# Patient Record
Sex: MALE | Race: WHITE | NOT HISPANIC OR LATINO | Employment: OTHER | URBAN - METROPOLITAN AREA
[De-identification: names, ages, dates, MRNs, and addresses within clinical notes are randomized per-mention and may not be internally consistent; named-entity substitution may affect disease eponyms.]

---

## 2019-04-10 ENCOUNTER — OFFICE VISIT (OUTPATIENT)
Dept: ORTHOPEDICS | Facility: CLINIC | Age: 69
End: 2019-04-10
Payer: MEDICARE

## 2019-04-10 VITALS
DIASTOLIC BLOOD PRESSURE: 84 MMHG | BODY MASS INDEX: 31.02 KG/M2 | WEIGHT: 193 LBS | HEART RATE: 82 BPM | SYSTOLIC BLOOD PRESSURE: 138 MMHG | HEIGHT: 66 IN

## 2019-04-10 DIAGNOSIS — M54.12 CERVICAL RADICULITIS: Primary | ICD-10-CM

## 2019-04-10 PROCEDURE — 1101F PR PT FALLS ASSESS DOC 0-1 FALLS W/OUT INJ PAST YR: ICD-10-PCS | Mod: ,,, | Performed by: ORTHOPAEDIC SURGERY

## 2019-04-10 PROCEDURE — 1101F PT FALLS ASSESS-DOCD LE1/YR: CPT | Mod: ,,, | Performed by: ORTHOPAEDIC SURGERY

## 2019-04-10 PROCEDURE — 99203 PR OFFICE/OUTPT VISIT, NEW, LEVL III, 30-44 MIN: ICD-10-PCS | Mod: ,,, | Performed by: ORTHOPAEDIC SURGERY

## 2019-04-10 PROCEDURE — 99203 OFFICE O/P NEW LOW 30 MIN: CPT | Mod: ,,, | Performed by: ORTHOPAEDIC SURGERY

## 2019-04-10 PROCEDURE — 72040: ICD-10-PCS | Mod: ,,, | Performed by: ORTHOPAEDIC SURGERY

## 2019-04-10 PROCEDURE — 72040 X-RAY EXAM NECK SPINE 2-3 VW: CPT | Mod: ,,, | Performed by: ORTHOPAEDIC SURGERY

## 2019-04-10 RX ORDER — TIZANIDINE 4 MG/1
4 TABLET ORAL EVERY 8 HOURS
Qty: 60 TABLET | Refills: 0 | Status: SHIPPED | OUTPATIENT
Start: 2019-04-10 | End: 2019-04-20

## 2019-04-10 RX ORDER — GABAPENTIN 300 MG/1
300 CAPSULE ORAL 3 TIMES DAILY
COMMUNITY
End: 2020-03-05 | Stop reason: SDUPTHER

## 2019-04-10 RX ORDER — MELOXICAM 15 MG/1
15 TABLET ORAL DAILY
COMMUNITY
End: 2019-05-20

## 2019-04-10 RX ORDER — METHYLPREDNISOLONE 4 MG/1
TABLET ORAL
Qty: 1 PACKAGE | Refills: 0 | Status: SHIPPED | OUTPATIENT
Start: 2019-04-10 | End: 2019-05-01

## 2019-04-10 RX ORDER — HYDROCODONE BITARTRATE AND ACETAMINOPHEN 5; 325 MG/1; MG/1
1 TABLET ORAL EVERY 6 HOURS PRN
Qty: 28 TABLET | Refills: 0 | Status: SHIPPED | OUTPATIENT
Start: 2019-04-10 | End: 2019-04-17

## 2019-04-10 NOTE — PROGRESS NOTES
Subjective:       Patient ID: Tyler Hannah is a 68 y.o. male.    Chief Complaint: Pain of the Neck (Neck pain since Sunday and right arm numbness to fingers. No other treatment)      History of Present Illness  68-year-old gentleman with acute onset of right arm numbness and neck pain one week ago. He is been taking meloxicam and gabapentin for chronic low back pain arthritis and radiculopathy in these medicines will seem to help he has no bowel or bladder  dysfunction he does complain of pain range of motion of his neck    Current Medications  Current Outpatient Medications   Medication Sig Dispense Refill    gabapentin (NEURONTIN) 300 MG capsule Take 300 mg by mouth 3 (three) times daily.      meloxicam (MOBIC) 15 MG tablet Take 15 mg by mouth once daily.       No current facility-administered medications for this visit.        Allergies  Review of patient's allergies indicates:  No Known Allergies    Past Medical History  Past Medical History:   Diagnosis Date    Arthritis        Surgical History  Past Surgical History:   Procedure Laterality Date    right arm         Family History:   History reviewed. No pertinent family history.    Social History:   Social History     Socioeconomic History    Marital status:      Spouse name: Not on file    Number of children: Not on file    Years of education: Not on file    Highest education level: Not on file   Occupational History    Not on file   Social Needs    Financial resource strain: Not on file    Food insecurity:     Worry: Not on file     Inability: Not on file    Transportation needs:     Medical: Not on file     Non-medical: Not on file   Tobacco Use    Smoking status: Never Smoker    Smokeless tobacco: Never Used   Substance and Sexual Activity    Alcohol use: Not on file    Drug use: Not on file    Sexual activity: Not on file   Lifestyle    Physical activity:     Days per week: Not on file     Minutes per session: Not on file    Stress: Not on file   Relationships    Social connections:     Talks on phone: Not on file     Gets together: Not on file     Attends Voodoo service: Not on file     Active member of club or organization: Not on file     Attends meetings of clubs or organizations: Not on file     Relationship status: Not on file   Other Topics Concern    Not on file   Social History Narrative    Not on file       Hospitalization/Major Diagnostic Procedure:     Review of  Systems     General/Constitutional:  Chills denies. Fatigue denies. Fever denies. Weight gain denies. Weight loss denies.    Respiratory:  Shortness of breath denies.    Cardiovascular:  Chest pain denies.    Gastrointestinal:  Constipation denies. Diarrhea denies. Nausea denies. Vomiting denies.     Hematology:  Easy bruising denies. Prolonged bleeding denies.     Genitourinary:  Frequent urination denies. Pain in lower back denies. Painful urination denies.     Musculoskeletal:  See HPI for details    Skin:  Rash denies.    Neurologic:  Dizziness denies. Gait abnormalities denies. Seizures denies. Tingling/Numbess denies.    Psychiatric:  Anxiety denies. Depressed mood denies.     Objective:   Vital Signs:   Vitals:    04/10/19 0919   BP: 138/84   Pulse: 82        Physical Exam      General Examination:     Constitutional: The patient is alert and oriented to lace person and time. Mood is pleasant.     Head/Face: Normal facial features normal eyebrows    Eyes: Normal extraocular motion bilaterally    Lungs: Respirations are equal and unlabored    Gait is coordinated.    Cardiovascular: There are no swelling or varicosities present.    Lymphatic: Negative for adenopathy    Skin: Normal    Neurological: Level of consciousness normal. Oriented to place person and time and situation    Psychiatric: Oriented to time place person and situation    Examination of cervical spine shows marked restriction of motion tenderness in the right trapezius and paraspinous muscles without spasm Spurling's maneuver is positive on the right subjective numbness C6 nerve root distribution on the right motor exam grossly intact reflexes symmetrical but hypoactive Phalen's test negative.    XRAY Report/ Interpretation : AP and lateral x-rays of the cervical spine were performed today. Indications neck pain. Findings: Now multilevel posterior facet arthritis demonstrated mild narrowing at C3-4 disc space      Assessment:       1. Cervical  radiculitis        Plan:       Tyler was seen today for pain.    Diagnoses and all orders for this visit:    Cervical radiculitis  -     X-Ray Cervical Spine AP And Lateral         No follow-ups on file.    Ramya options were discussed we'll start him on a course of medication for her symptoms. I've advised that temporarily stop taking his meloxicam and should not take both meloxicam and a steroid Dosepak at the same time we'll see him back in 10-14 days and if his symptoms don't resolve we will order an MRI of the cervical spine. He has been advised her to stop taking the meloxicam until he finishes the Medrol Dosepak and understands that they should not be taken together    This note was created using Dragon voice recognition software that occasionally misinterpreted phrases or words.

## 2019-04-22 ENCOUNTER — TELEPHONE (OUTPATIENT)
Dept: ORTHOPEDICS | Facility: CLINIC | Age: 69
End: 2019-04-22

## 2019-04-22 DIAGNOSIS — M54.12 CERVICAL RADICULITIS: Primary | ICD-10-CM

## 2019-04-22 NOTE — TELEPHONE ENCOUNTER
----- Message from Flaca Jamil sent at 4/22/2019  9:10 AM CDT -----  Patient called stated pain isn't any better.  Needs MRI of cervical and right shoulder.

## 2019-05-06 ENCOUNTER — TELEPHONE (OUTPATIENT)
Dept: ORTHOPEDICS | Facility: CLINIC | Age: 69
End: 2019-05-06

## 2019-05-06 ENCOUNTER — OFFICE VISIT (OUTPATIENT)
Dept: ORTHOPEDICS | Facility: CLINIC | Age: 69
End: 2019-05-06
Payer: MEDICARE

## 2019-05-06 VITALS
WEIGHT: 188 LBS | BODY MASS INDEX: 30.22 KG/M2 | HEIGHT: 66 IN | DIASTOLIC BLOOD PRESSURE: 80 MMHG | HEART RATE: 95 BPM | SYSTOLIC BLOOD PRESSURE: 158 MMHG

## 2019-05-06 DIAGNOSIS — M48.02 FORAMINAL STENOSIS OF CERVICAL REGION: ICD-10-CM

## 2019-05-06 DIAGNOSIS — M50.30 DEGENERATIVE CERVICAL DISC: Primary | ICD-10-CM

## 2019-05-06 PROCEDURE — 1101F PR PT FALLS ASSESS DOC 0-1 FALLS W/OUT INJ PAST YR: ICD-10-PCS | Mod: ,,, | Performed by: ORTHOPAEDIC SURGERY

## 2019-05-06 PROCEDURE — 99213 PR OFFICE/OUTPT VISIT, EST, LEVL III, 20-29 MIN: ICD-10-PCS | Mod: ,,, | Performed by: ORTHOPAEDIC SURGERY

## 2019-05-06 PROCEDURE — 99213 OFFICE O/P EST LOW 20 MIN: CPT | Mod: ,,, | Performed by: ORTHOPAEDIC SURGERY

## 2019-05-06 PROCEDURE — 1101F PT FALLS ASSESS-DOCD LE1/YR: CPT | Mod: ,,, | Performed by: ORTHOPAEDIC SURGERY

## 2019-05-06 RX ORDER — FLUTICASONE FUROATE AND VILANTEROL TRIFENATATE 100; 25 UG/1; UG/1
POWDER RESPIRATORY (INHALATION)
Refills: 5 | COMMUNITY
Start: 2019-04-10 | End: 2019-06-10

## 2019-05-06 RX ORDER — HYDROCODONE BITARTRATE AND ACETAMINOPHEN 5; 325 MG/1; MG/1
1 TABLET ORAL EVERY 6 HOURS PRN
Qty: 28 TABLET | Refills: 0 | Status: SHIPPED | OUTPATIENT
Start: 2019-05-06 | End: 2019-05-13

## 2019-05-06 NOTE — PROGRESS NOTES
Subjective:       Patient ID: Tyler Hannah is a 68 y.o. male.    Chief Complaint: Pain of the Neck (MRI results) and Pain of the Right Shoulder      History of Present Illness  Mr. Hannah returns for follow-up still has right-sided neck pain radiating down to his right thumb is here to discuss results of the MRI    Current Medications  Current Outpatient Medications   Medication Sig Dispense Refill    BREO ELLIPTA 100-25 mcg/dose diskus inhaler   5    gabapentin (NEURONTIN) 300 MG capsule Take 300 mg by mouth 3 (three) times daily.      meloxicam (MOBIC) 15 MG tablet Take 15 mg by mouth once daily.      HYDROcodone-acetaminophen (NORCO) 5-325 mg per tablet Take 1 tablet by mouth every 6 (six) hours as needed for Pain. 28 tablet 0     No current facility-administered medications for this visit.        Allergies  Review of patient's allergies indicates:  No Known Allergies    Past Medical History  Past Medical History:   Diagnosis Date    Arthritis        Surgical History  Past Surgical History:   Procedure Laterality Date    right arm         Family History:   History reviewed. No pertinent family history.    Social History:   Social History     Socioeconomic History    Marital status:      Spouse name: Not on file    Number of children: Not on file    Years of education: Not on file    Highest education level: Not on file   Occupational History    Not on file   Social Needs    Financial resource strain: Not on file    Food insecurity:     Worry: Not on file     Inability: Not on file    Transportation needs:     Medical: Not on file     Non-medical: Not on file   Tobacco Use    Smoking status: Never Smoker    Smokeless tobacco: Never Used   Substance and Sexual Activity    Alcohol use: Not on file    Drug use: Not on file    Sexual activity: Not on file   Lifestyle    Physical activity:     Days per week: Not on file     Minutes per session: Not on file    Stress: Not on file    Relationships    Social connections:     Talks on phone: Not on file     Gets together: Not on file     Attends Jewish service: Not on file     Active member of club or organization: Not on file     Attends meetings of clubs or organizations: Not on file     Relationship status: Not on file   Other Topics Concern    Not on file   Social History Narrative    Not on file       Hospitalization/Major Diagnostic Procedure:     Review of Systems     General/Constitutional:  Chills denies. Fatigue denies. Fever denies. Weight gain denies. Weight loss denies.    Respiratory:  Shortness of breath denies.    Cardiovascular:  Chest pain denies.    Gastrointestinal:  Constipation denies. Diarrhea denies. Nausea denies. Vomiting denies.     Hematology:  Easy bruising denies. Prolonged bleeding denies.     Genitourinary:  Frequent urination denies. Pain in lower back denies. Painful urination denies.     Musculoskeletal:  See HPI for details    Skin:  Rash denies.    Neurologic:  Dizziness denies. Gait abnormalities denies. Seizures denies. Tingling/Numbess denies.    Psychiatric:  Anxiety denies. Depressed mood denies.     Objective:   Vital Signs:   Vitals:    05/06/19 1242   BP: (!) 158/80   Pulse:         Physical Exam      General Examination:     Constitutional: The patient is alert and oriented to lace person and time. Mood is pleasant.     Head/Face: Normal facial features normal eyebrows    Eyes: Normal extraocular motion bilaterally    Lungs: Respirations are equal and unlabored    Gait is coordinated.    Cardiovascular: There are no swelling or varicosities present.    Lymphatic: Negative for adenopathy    Skin: Normal    Neurological: Level of consciousness normal. Oriented to place person and time and situation    Psychiatric: Oriented to time place person and situation    Examination cervical spine shows moderate restriction of motion Meme's maneuver positive on the right side subjective numbness noted  in the right thumb and index finger  XRAY Report/ Interpretation: Cervical MRI was personally reviewed is evidence of disc degeneration C4-5 5667, Bill stenosis noted on the right at C5-6 and left-sided C6-7 I look at the MRI I think there is a low foraminal stenosis on the right side at C6-7      Assessment:       1. Degenerative cervical disc    2. Foraminal stenosis of cervical region        Plan:       Tyler was seen today for pain and pain.    Diagnoses and all orders for this visit:    Degenerative cervical disc    Foraminal stenosis of cervical region    Other orders  -     HYDROcodone-acetaminophen (NORCO) 5-325 mg per tablet; Take 1 tablet by mouth every 6 (six) hours as needed for Pain.         Follow up in about 1 month (around 6/3/2019).    Transforaminal epidural steroid injections of the right C6 C6 and C7 nerve roots advise at this time    This note was created using Dragon voice recognition software that occasionally misinterpreted phrases or words.

## 2019-05-07 DIAGNOSIS — M54.12 CERVICAL RADICULOPATHY: Primary | ICD-10-CM

## 2019-05-20 RX ORDER — IBUPROFEN 200 MG
200 TABLET ORAL EVERY 6 HOURS PRN
COMMUNITY
End: 2020-03-05

## 2019-05-21 ENCOUNTER — HOSPITAL ENCOUNTER (OUTPATIENT)
Facility: AMBULARY SURGERY CENTER | Age: 69
Discharge: HOME | End: 2019-05-21
Attending: ANESTHESIOLOGY | Admitting: ANESTHESIOLOGY
Payer: MEDICARE

## 2019-05-21 DIAGNOSIS — M54.12 CERVICAL RADICULITIS: Primary | ICD-10-CM

## 2019-05-21 PROCEDURE — 99152 MOD SED SAME PHYS/QHP 5/>YRS: CPT | Mod: ,,, | Performed by: ANESTHESIOLOGY

## 2019-05-21 PROCEDURE — 99152 PR MOD CONSCIOUS SEDATION, SAME PHYS, 5+ YRS, FIRST 15 MIN: ICD-10-PCS | Mod: ,,, | Performed by: ANESTHESIOLOGY

## 2019-05-21 PROCEDURE — 64479 PR INJECT ANES/STEROID FORAMEN CERV/THORACIC W IMG GUIDE ,1 LEVEL: ICD-10-PCS | Mod: RT,,, | Performed by: ANESTHESIOLOGY

## 2019-05-21 PROCEDURE — 64480 NJX AA&/STRD TFRM EPI C/T EA: CPT | Mod: RT,,, | Performed by: ANESTHESIOLOGY

## 2019-05-21 PROCEDURE — 64480 NJX AA&/STRD TFRM EPI C/T EA: CPT | Performed by: ANESTHESIOLOGY

## 2019-05-21 PROCEDURE — 64479 NJX AA&/STRD TFRM EPI C/T 1: CPT | Performed by: ANESTHESIOLOGY

## 2019-05-21 PROCEDURE — 64479 NJX AA&/STRD TFRM EPI C/T 1: CPT | Mod: RT,,, | Performed by: ANESTHESIOLOGY

## 2019-05-21 PROCEDURE — 64480 PRA INJECT ANES/STEROID FORAMEN CERV/THORACIC W IMG GUIDE ,EA ADD LEVEL: ICD-10-PCS | Mod: RT,,, | Performed by: ANESTHESIOLOGY

## 2019-05-21 RX ORDER — FENTANYL CITRATE 50 UG/ML
INJECTION, SOLUTION INTRAMUSCULAR; INTRAVENOUS
Status: DISCONTINUED | OUTPATIENT
Start: 2019-05-21 | End: 2019-05-21 | Stop reason: HOSPADM

## 2019-05-21 RX ORDER — MIDAZOLAM HYDROCHLORIDE 1 MG/ML
INJECTION INTRAMUSCULAR; INTRAVENOUS
Status: DISCONTINUED | OUTPATIENT
Start: 2019-05-21 | End: 2019-05-21 | Stop reason: HOSPADM

## 2019-05-21 RX ORDER — LIDOCAINE HYDROCHLORIDE AND EPINEPHRINE 10; 10 MG/ML; UG/ML
INJECTION, SOLUTION INFILTRATION; PERINEURAL
Status: DISCONTINUED | OUTPATIENT
Start: 2019-05-21 | End: 2019-05-21 | Stop reason: HOSPADM

## 2019-05-21 RX ORDER — LIDOCAINE HYDROCHLORIDE 10 MG/ML
INJECTION, SOLUTION EPIDURAL; INFILTRATION; INTRACAUDAL; PERINEURAL
Status: DISCONTINUED | OUTPATIENT
Start: 2019-05-21 | End: 2019-05-21 | Stop reason: HOSPADM

## 2019-05-21 RX ORDER — LIDOCAINE HYDROCHLORIDE AND EPINEPHRINE 10; 10 MG/ML; UG/ML
INJECTION, SOLUTION INFILTRATION; PERINEURAL
Status: DISCONTINUED
Start: 2019-05-21 | End: 2019-05-21 | Stop reason: HOSPADM

## 2019-05-21 RX ORDER — LIDOCAINE HYDROCHLORIDE 10 MG/ML
INJECTION, SOLUTION EPIDURAL; INFILTRATION; INTRACAUDAL; PERINEURAL
Status: DISCONTINUED
Start: 2019-05-21 | End: 2019-05-21 | Stop reason: HOSPADM

## 2019-05-21 RX ORDER — SODIUM CHLORIDE, SODIUM LACTATE, POTASSIUM CHLORIDE, CALCIUM CHLORIDE 600; 310; 30; 20 MG/100ML; MG/100ML; MG/100ML; MG/100ML
INJECTION, SOLUTION INTRAVENOUS CONTINUOUS
Status: DISCONTINUED | OUTPATIENT
Start: 2019-05-21 | End: 2019-05-21 | Stop reason: HOSPADM

## 2019-05-21 RX ORDER — DEXAMETHASONE SODIUM PHOSPHATE 10 MG/ML
INJECTION INTRAMUSCULAR; INTRAVENOUS
Status: DISCONTINUED
Start: 2019-05-21 | End: 2019-05-21 | Stop reason: HOSPADM

## 2019-05-21 RX ORDER — DEXAMETHASONE SODIUM PHOSPHATE 10 MG/ML
INJECTION INTRAMUSCULAR; INTRAVENOUS
Status: DISCONTINUED | OUTPATIENT
Start: 2019-05-21 | End: 2019-05-21 | Stop reason: HOSPADM

## 2019-05-21 RX ADMIN — SODIUM CHLORIDE, SODIUM LACTATE, POTASSIUM CHLORIDE, CALCIUM CHLORIDE: 600; 310; 30; 20 INJECTION, SOLUTION INTRAVENOUS at 11:05

## 2019-05-21 NOTE — DISCHARGE SUMMARY
[facility]  Discharge Note  Short Stay    Admit Date: 5/21/2019    Discharge Date and Time: 5/21/2019    Attending Physician: Blade Devine MD     Discharge Provider: Blade Devine    Diagnoses:  Active Hospital Problems    Diagnosis  POA    *Cervical radiculitis [M54.12]  Yes      Resolved Hospital Problems   No resolved problems to display.       Hospital Course: Cervical WILLIS  Discharged Condition: Good    Final Diagnoses:   Active Hospital Problems    Diagnosis  POA    *Cervical radiculitis [M54.12]  Yes      Resolved Hospital Problems   No resolved problems to display.       Disposition: Home or Self Care    Follow up/Patient Instructions:    Medications:  Reconciled Home Medications:      Medication List      CONTINUE taking these medications    BREO ELLIPTA 100-25 mcg/dose diskus inhaler  Generic drug:  fluticasone furoate-vilanterol     gabapentin 300 MG capsule  Commonly known as:  NEURONTIN  Take 300 mg by mouth 3 (three) times daily.     ibuprofen 200 MG tablet  Commonly known as:  ADVIL,MOTRIN  Take 200 mg by mouth every 6 (six) hours as needed for Pain.          Discharge Procedure Orders   Call MD for:  temperature >100.4     Call MD for:  persistent nausea and vomiting or diarrhea     Call MD for:  severe uncontrolled pain     Call MD for:  redness, tenderness, or signs of infection (pain, swelling, redness, odor or green/yellow discharge around incision site)     Call MD for:  difficulty breathing or increased cough     Call MD for:  severe persistent headache        Follow up with MD in 2-3 weeks    Discharge Procedure Orders (must include Diet, Follow-up, Activity):   Discharge Procedure Orders (must include Diet, Follow-up, Activity)   Call MD for:  temperature >100.4     Call MD for:  persistent nausea and vomiting or diarrhea     Call MD for:  severe uncontrolled pain     Call MD for:  redness, tenderness, or signs of infection (pain, swelling, redness, odor or green/yellow discharge  around incision site)     Call MD for:  difficulty breathing or increased cough     Call MD for:  severe persistent headache

## 2019-05-21 NOTE — OP NOTE
PROCEDURE DATE: 5/21/2019    PROCEDURE: Right C6 and C7 transforaminal epidural steroid injection under fluoroscopy     DIAGNOSIS: cervical disc displacement, cervical radiculopathy     Post op diagnosis: Same     PHYSICIAN: Blade Devine MD     MEDICATIONS INJECTED: Decadron 5mg and 0.5ml 1% lidocaine at each nerve root.     LOCAL ANESTHETIC INJECTED: Lidocaine 1%. 1 ml per site.     SEDATION MEDICATIONS: RN IV sedation    ESTIMATED BLOOD LOSS: none     COMPLICATIONS: none     TECHNIQUE: A time-out was taken to identify patient and procedure side prior to starting the procedure. The patient was placed in a prone position, prepped and draped in the usual sterile fashion using ChloraPrep and sterile towels. The area to be injected was determined under fluoroscopic guidance in AP and oblique view. Local anesthetic was given by raising a wheal and going down to the hub of a 25-gauge 1.5 inch needle. In oblique view, a 2inch 25-gauge bent-tip spinal needle was introduced towards posterior inferior portion of the right C6 and C7 foramen using the oblique view. C-arm was rotated about 45 degrees off vertical and 10-20 degrees cephalad to caudal. The needle was advanced in AP until tip of needle was past the lateral margins. 0.5ml contrast dye was injected to confirm appropriate placement and that there was no vascular uptake.  Test dose of 1ml  1% lidocaine with epinephrine was given.  There was no changes in vital signs.  After negative aspiration for blood or CSF, the medication was then injected. This was performed at the right C6 and C7 level(s). The patient tolerated the procedure well.     The patient was monitored after the procedure. Patient was given post procedure and discharge instructions to follow at home. The patient was discharged in a stable condition.

## 2019-05-21 NOTE — PLAN OF CARE
Patient sitting in chair and is able to stand up and ambulate at bedside without dizziness. He  states ready to go home; denies pain nausea or any weakness.  Patient's spouse present at chairside and states she is ready to take patient home and thats he is driving the patient home. All belongings listed on pre op check list have been returned to patient. Patient states has own ice pack at home to use for prn injection site pain. Patient and his spouse able to teach back all discharge instructions.

## 2019-05-21 NOTE — DISCHARGE INSTRUCTIONS
Anesthesia information    Anesthesia Safety      You have been given medicine  to sedate you during your procedure today. This may have included both a pain medicine and sleeping medicine. Most of the effects have worn off; however, you may continue to have some drowsiness for the next  24 hours. Anesthesia and pain medicines can cause nausea, sleepiness, dizziness and  constipation.    HOME CARE:  1) For the next EIGHT HOURS, you should be watched by a responsible adult to look for any worsening of your condition.  2) DO NOT DRINK any ALCOHOL for the next 24 HOURS.  3) DO NOT DRIVE or operate dangerous machinery during the next 24 HOURS.  FOLLOW UP with your doctor or this facility if you are not alert and back to your usual level of activity within 24 hrs.  GET PROMPT MEDICAL ATTENTION if any of the following occur:  -- Increased drowsiness  -- Increased weakness or dizziness  -- Repeated vomiting  -- If you cannot be awakened    Before leaving, please make sure you have all your personal belongings such as glasses, purses, wallets, keys, cell phones, jewelry, jackets etc Pain injection instructions:     This procedure may take a couple weeks to relieve pain    No driving for 24 hrs.   Activity as tolerated- gradually increase activities.  Dont lift over 10 lbs for 24 hrs   No heat at injection sites x 2 days. No heating pads, hot tubs, saunas, or swimming in any body of water or pool for 2 days.  Use ice pack for mild swelling and for comfort , apply for 20 minutes, remove for 20 minute intervals. No direct contact of ice itself  to skin.  May shower today. Do not allow shower water to hit on injection site foe 2 days.No tub baths for two days.      Resume Aspirin, Plavix, or Coumadin the day after the procedure unless otherwise instructed.   If diabetic,monitor your glucose carefully as steroids can increase your glucose level    Seek immediate medical help for:   Severe increase in your usual pain or  appearance of new pain.  Prolonged (more than 8 hours) or increasing weakness or numbness in the legs or arms.  .    Fever above 101 ,Drainage,redness,active bleeding, or increased swelling at the injection site.  Headache, shortness of breath, chest pain, or breathing problems.

## 2019-05-22 VITALS
SYSTOLIC BLOOD PRESSURE: 144 MMHG | OXYGEN SATURATION: 95 % | TEMPERATURE: 98 F | WEIGHT: 188 LBS | DIASTOLIC BLOOD PRESSURE: 85 MMHG | HEIGHT: 66 IN | HEART RATE: 79 BPM | RESPIRATION RATE: 18 BRPM | BODY MASS INDEX: 30.22 KG/M2

## 2019-06-10 ENCOUNTER — OFFICE VISIT (OUTPATIENT)
Dept: ORTHOPEDICS | Facility: CLINIC | Age: 69
End: 2019-06-10
Payer: MEDICARE

## 2019-06-10 VITALS
HEIGHT: 66 IN | WEIGHT: 190 LBS | BODY MASS INDEX: 30.53 KG/M2 | DIASTOLIC BLOOD PRESSURE: 78 MMHG | SYSTOLIC BLOOD PRESSURE: 138 MMHG | HEART RATE: 85 BPM

## 2019-06-10 DIAGNOSIS — M54.12 CERVICAL RADICULITIS: ICD-10-CM

## 2019-06-10 DIAGNOSIS — M48.02 FORAMINAL STENOSIS OF CERVICAL REGION: ICD-10-CM

## 2019-06-10 DIAGNOSIS — M50.30 DEGENERATIVE CERVICAL DISC: Primary | ICD-10-CM

## 2019-06-10 PROCEDURE — 99213 PR OFFICE/OUTPT VISIT, EST, LEVL III, 20-29 MIN: ICD-10-PCS | Mod: ,,, | Performed by: ORTHOPAEDIC SURGERY

## 2019-06-10 PROCEDURE — 99213 OFFICE O/P EST LOW 20 MIN: CPT | Mod: ,,, | Performed by: ORTHOPAEDIC SURGERY

## 2019-06-10 PROCEDURE — 1101F PR PT FALLS ASSESS DOC 0-1 FALLS W/OUT INJ PAST YR: ICD-10-PCS | Mod: ,,, | Performed by: ORTHOPAEDIC SURGERY

## 2019-06-10 PROCEDURE — 1101F PT FALLS ASSESS-DOCD LE1/YR: CPT | Mod: ,,, | Performed by: ORTHOPAEDIC SURGERY

## 2019-06-10 RX ORDER — TRIAMCINOLONE ACETONIDE 1 MG/G
CREAM TOPICAL
COMMUNITY
Start: 2019-06-04 | End: 2020-03-05 | Stop reason: SDUPTHER

## 2019-06-10 RX ORDER — MELOXICAM 15 MG/1
15 TABLET ORAL DAILY
COMMUNITY
End: 2020-03-05 | Stop reason: SDUPTHER

## 2019-06-10 NOTE — PROGRESS NOTES
Subjective:       Patient ID: Tyler Hannah is a 68 y.o. male.    Chief Complaint: Pain of the Neck (He is doing great with no neck pain since getting injection with Dr Devine)      History of Present Illness  Is here to follow-up for right-sided cervicalgia with right upper extremity radiculitis. When he was last here he was seen by Dr. Mo who reviewed his MRI and then referred him for right C5, C6, and C7 transforaminal WILLIS. He had this done by Dr. Devine and is doing fantastically well. No Symptoms whatsoever.    Current Medications  Current Outpatient Medications   Medication Sig Dispense Refill    gabapentin (NEURONTIN) 300 MG capsule Take 300 mg by mouth 3 (three) times daily.      ibuprofen (ADVIL,MOTRIN) 200 MG tablet Take 200 mg by mouth every 6 (six) hours as needed for Pain.      meloxicam (MOBIC) 15 MG tablet Take 15 mg by mouth once daily.      triamcinolone acetonide 0.1% (KENALOG) 0.1 % cream        No current facility-administered medications for this visit.        Allergies  Review of patient's allergies indicates:  No Known Allergies    Past Medical History  Past Medical History:   Diagnosis Date    Arthritis        Surgical History  Past Surgical History:   Procedure Laterality Date    Injection,steroid,epidural,transforaminal approach Right 5/21/2019    Performed by Blade Devine MD at [facility]    right arm         Family History:   History reviewed. No pertinent family history.    Social History:   Social History     Socioeconomic History    Marital status:      Spouse name: Not on file    Number of children: Not on file    Years of education: Not on file    Highest education level: Not on file   Occupational History    Not on file   Social Needs    Financial resource strain: Not on file    Food insecurity:     Worry: Not on file     Inability: Not on file    Transportation needs:     Medical: Not on file     Non-medical: Not on file   Tobacco Use    Smoking status: Never Smoker     Smokeless tobacco: Never Used   Substance and Sexual Activity    Alcohol use: Not on file    Drug use: Not on file    Sexual activity: Not on file   Lifestyle    Physical activity:     Days per week: Not on file     Minutes per session: Not on file    Stress: Not on file   Relationships    Social connections:     Talks on phone: Not on file     Gets together: Not on file     Attends Spiritism service: Not on file     Active member of club or organization: Not on file     Attends meetings of clubs or organizations: Not on file     Relationship status: Not on file   Other Topics Concern    Not on file   Social History Narrative    Not on file       Hospitalization/Major Diagnostic Procedure:     Review of Systems     General/Constitutional:  Chills denies. Fatigue denies. Fever denies. Weight gain denies. Weight loss denies.    Respiratory:  Shortness of breath denies.    Cardiovascular:  Chest pain denies.    Gastrointestinal:  Constipation denies. Diarrhea denies. Nausea denies. Vomiting denies.     Hematology:  Easy bruising denies. Prolonged bleeding denies.     Genitourinary:  Frequent urination denies. Pain in lower back denies. Painful urination denies.     Musculoskeletal:  See HPI for details    Skin:  Rash denies.    Neurologic:  Dizziness denies. Gait abnormalities denies. Seizures denies. Tingling/Numbess denies.    Psychiatric:  Anxiety denies. Depressed mood denies.     Objective:   Vital Signs:   Vitals:    06/10/19 0802   BP: 138/78   Pulse: 85        Physical Exam      General Examination:     Constitutional: The patient is alert and oriented to lace person and time. Mood is pleasant.     Head/Face: Normal facial features normal eyebrows    Eyes: Normal extraocular motion bilaterally    Lungs: Respirations are equal and unlabored    Gait is coordinated.    Cardiovascular: There are no swelling or varicosities present.    Lymphatic: Negative for adenopathy    Skin: Normal    Neurological:  "Level of consciousness normal. Oriented to place person and time and situation    Psychiatric: Oriented to time place person and situation    Cervical exam: Active range of motion only mildly restricted. No significant tenderness palpation. Bilateral upper extremities and was reflected range of motion, equal and symmetric DTRs, and normal strength with a negative Blank's.  XRAY Report/ Interpretation: No new studies today      Assessment:       1. Degenerative cervical disc    2. Foraminal stenosis of cervical region    3. Cervical radiculitis        Plan:       Tyler was seen today for pain.    Diagnoses and all orders for this visit:    Degenerative cervical disc    Foraminal stenosis of cervical region    Cervical radiculitis         Follow up if symptoms worsen or fail to improve.  Sudhir Rousseau, physician's assistant served in the capacity as a "scribe" for this patient encounter  A "face to face" encounter occurred with Dr. Mo on this date  The treatment plan and medical decision making is outlined below:  Continue with activities as tolerated. Follow-up as needed. If symptoms returned then more than likely we would recommend a repeat right C5, C6, and C7 transforaminal WILLIS.    This note was created using Dragon voice recognition software that occasionally misinterpreted phrases or words.  "

## 2020-03-05 ENCOUNTER — OFFICE VISIT (OUTPATIENT)
Dept: FAMILY MEDICINE | Facility: CLINIC | Age: 70
End: 2020-03-05
Payer: MEDICARE

## 2020-03-05 ENCOUNTER — TELEPHONE (OUTPATIENT)
Dept: FAMILY MEDICINE | Facility: CLINIC | Age: 70
End: 2020-03-05

## 2020-03-05 VITALS
SYSTOLIC BLOOD PRESSURE: 138 MMHG | BODY MASS INDEX: 31.23 KG/M2 | HEIGHT: 67 IN | DIASTOLIC BLOOD PRESSURE: 82 MMHG | HEART RATE: 80 BPM | WEIGHT: 199 LBS | OXYGEN SATURATION: 94 %

## 2020-03-05 DIAGNOSIS — Z00.00 LABORATORY EXAM ORDERED AS PART OF ROUTINE GENERAL MEDICAL EXAMINATION: ICD-10-CM

## 2020-03-05 DIAGNOSIS — L30.9 ECZEMA, UNSPECIFIED TYPE: ICD-10-CM

## 2020-03-05 DIAGNOSIS — G47.33 OSA (OBSTRUCTIVE SLEEP APNEA): ICD-10-CM

## 2020-03-05 DIAGNOSIS — M50.30 DDD (DEGENERATIVE DISC DISEASE), CERVICAL: ICD-10-CM

## 2020-03-05 DIAGNOSIS — Z12.11 SCREENING FOR COLON CANCER: ICD-10-CM

## 2020-03-05 DIAGNOSIS — Z80.0 FAMILY HISTORY OF COLON CANCER IN MOTHER: ICD-10-CM

## 2020-03-05 DIAGNOSIS — J45.20 MILD INTERMITTENT ASTHMA WITHOUT COMPLICATION: ICD-10-CM

## 2020-03-05 DIAGNOSIS — M51.36 DDD (DEGENERATIVE DISC DISEASE), LUMBAR: Primary | ICD-10-CM

## 2020-03-05 DIAGNOSIS — Z11.59 ENCOUNTER FOR HEPATITIS C SCREENING TEST FOR LOW RISK PATIENT: ICD-10-CM

## 2020-03-05 DIAGNOSIS — Z12.5 PROSTATE CANCER SCREENING: ICD-10-CM

## 2020-03-05 PROBLEM — M51.369 DDD (DEGENERATIVE DISC DISEASE), LUMBAR: Status: ACTIVE | Noted: 2020-03-05

## 2020-03-05 PROCEDURE — 1159F PR MEDICATION LIST DOCUMENTED IN MEDICAL RECORD: ICD-10-PCS | Mod: S$GLB,,, | Performed by: NURSE PRACTITIONER

## 2020-03-05 PROCEDURE — 1159F MED LIST DOCD IN RCRD: CPT | Mod: S$GLB,,, | Performed by: NURSE PRACTITIONER

## 2020-03-05 PROCEDURE — 1101F PR PT FALLS ASSESS DOC 0-1 FALLS W/OUT INJ PAST YR: ICD-10-PCS | Mod: S$GLB,,, | Performed by: NURSE PRACTITIONER

## 2020-03-05 PROCEDURE — 99204 PR OFFICE/OUTPT VISIT, NEW, LEVL IV, 45-59 MIN: ICD-10-PCS | Mod: S$GLB,,, | Performed by: NURSE PRACTITIONER

## 2020-03-05 PROCEDURE — 1125F AMNT PAIN NOTED PAIN PRSNT: CPT | Mod: S$GLB,,, | Performed by: NURSE PRACTITIONER

## 2020-03-05 PROCEDURE — 1125F PR PAIN SEVERITY QUANTIFIED, PAIN PRESENT: ICD-10-PCS | Mod: S$GLB,,, | Performed by: NURSE PRACTITIONER

## 2020-03-05 PROCEDURE — 99204 OFFICE O/P NEW MOD 45 MIN: CPT | Mod: S$GLB,,, | Performed by: NURSE PRACTITIONER

## 2020-03-05 PROCEDURE — 1101F PT FALLS ASSESS-DOCD LE1/YR: CPT | Mod: S$GLB,,, | Performed by: NURSE PRACTITIONER

## 2020-03-05 RX ORDER — FLUTICASONE PROPIONATE 50 UG/1
POWDER, METERED RESPIRATORY (INHALATION)
COMMUNITY
End: 2020-03-05

## 2020-03-05 RX ORDER — TRIAMCINOLONE ACETONIDE 1 MG/G
CREAM TOPICAL
Qty: 454 BOTTLE | Refills: 1 | Status: SHIPPED | OUTPATIENT
Start: 2020-03-05 | End: 2020-09-01 | Stop reason: SDUPTHER

## 2020-03-05 RX ORDER — FLUTICASONE PROPIONATE 50 MCG
1 SPRAY, SUSPENSION (ML) NASAL DAILY
COMMUNITY

## 2020-03-05 RX ORDER — ALBUTEROL SULFATE 90 UG/1
2 AEROSOL, METERED RESPIRATORY (INHALATION) EVERY 4 HOURS PRN
Qty: 18 G | Refills: 2 | Status: SHIPPED | OUTPATIENT
Start: 2020-03-05 | End: 2020-09-01 | Stop reason: SDUPTHER

## 2020-03-05 RX ORDER — ALBUTEROL SULFATE 90 UG/1
2 AEROSOL, METERED RESPIRATORY (INHALATION) EVERY 4 HOURS PRN
COMMUNITY
End: 2020-03-05 | Stop reason: SDUPTHER

## 2020-03-05 RX ORDER — MELOXICAM 15 MG/1
15 TABLET ORAL DAILY
Qty: 90 TABLET | Refills: 1 | Status: SHIPPED | OUTPATIENT
Start: 2020-03-05 | End: 2020-09-01 | Stop reason: SDUPTHER

## 2020-03-05 RX ORDER — GABAPENTIN 300 MG/1
300 CAPSULE ORAL 3 TIMES DAILY
Qty: 270 CAPSULE | Refills: 1 | Status: SHIPPED | OUTPATIENT
Start: 2020-03-05 | End: 2020-09-01 | Stop reason: SDUPTHER

## 2020-03-05 NOTE — PROGRESS NOTES
SUBJECTIVE:    Patient ID: Tyler Hannah is a 69 y.o. male.    Chief Complaint: Establish Care (brought bottles tb//refills//order for cscope)      Pt here for new pt appt. Former pt of Tammy Galan NP  Reports hx of OA- mainly in bilat hands and cervical/lumbar areas. Had cervical WILLIS last year with Dr. Devine which has helped significantly with neck pain. Still has LBP, controlled with gabapentin. Hx of multiple lumbar ESIs/rhizotomy in the past which helped with sciatica.  Hx of asthma since childhood- uses albuterol prn, less than once a week  Last cscope approx 3 years ago with Dr. Duque- reports he's due for repeat    No visits with results within 6 Month(s) from this visit.   Latest known visit with results is:   No results found for any previous visit.       Past Medical History:   Diagnosis Date    Arthritis     Asthma      Past Surgical History:   Procedure Laterality Date    right arm      TRANSFORAMINAL EPIDURAL INJECTION OF STEROID Right 5/21/2019    Procedure: Injection,steroid,epidural,transforaminal approach;  Surgeon: Blade Devine MD;  Location: [facility];  Service: Pain Management;  Laterality: Right;  C6, C7     Family History   Problem Relation Age of Onset    Cancer Mother     Diabetes Father     Heart disease Father     No Known Problems Sister        Marital Status:   Alcohol History:  reports that he drinks alcohol.  Tobacco History:  reports that he has never smoked. He has never used smokeless tobacco.  Drug History:  has no drug history on file.    Review of patient's allergies indicates:  No Known Allergies    Current Outpatient Medications:     albuterol (VENTOLIN HFA) 90 mcg/actuation inhaler, Inhale 2 puffs into the lungs every 4 (four) hours as needed for Wheezing. Rescue, Disp: 18 g, Rfl: 2    fluticasone propionate (FLONASE) 50 mcg/actuation nasal spray, 1 spray by Each Nostril route once daily., Disp: , Rfl:     gabapentin (NEURONTIN) 300 MG capsule, Take 1  "capsule (300 mg total) by mouth 3 (three) times daily., Disp: 270 capsule, Rfl: 1    meloxicam (MOBIC) 15 MG tablet, Take 1 tablet (15 mg total) by mouth once daily., Disp: 90 tablet, Rfl: 1    triamcinolone acetonide 0.1% (KENALOG) 0.1 % cream, Apply topically as needed., Disp: 454 Bottle, Rfl: 1    Review of Systems   Constitutional: Negative for appetite change, chills and fever.   Respiratory: Negative for cough, shortness of breath and wheezing.    Cardiovascular: Negative for chest pain, palpitations and leg swelling.   Gastrointestinal: Negative for abdominal pain, constipation and diarrhea.   Genitourinary: Negative for dysuria, frequency and hematuria.   Musculoskeletal: Positive for arthralgias (bilat hands) and back pain (across lower back, no longer has sciatica pain). Negative for gait problem and neck pain.   Skin: Negative for rash.   Neurological: Negative for dizziness, syncope and numbness.   Psychiatric/Behavioral: Negative for dysphoric mood. The patient is not nervous/anxious.           Objective:      Vitals:    03/05/20 0814   BP: 138/82   Pulse: 80   SpO2: (!) 94%   Weight: 90.3 kg (199 lb)   Height: 5' 6.5" (1.689 m)     Physical Exam   Constitutional: He is oriented to person, place, and time. He appears well-developed and well-nourished.   HENT:   Head: Normocephalic and atraumatic.   Right Ear: Tympanic membrane and ear canal normal.   Left Ear: Tympanic membrane and ear canal normal.   Mouth/Throat: Mucous membranes are normal. No posterior oropharyngeal erythema.   Neck: Neck supple. Carotid bruit is not present.   Cardiovascular: Normal rate and regular rhythm. Exam reveals no gallop and no friction rub.   No murmur heard.  Pulmonary/Chest: Effort normal. No respiratory distress. He has no wheezes. He has no rales.   Slightly diminished throughout otherwise clear   Abdominal: Soft. He exhibits no distension. There is no tenderness.   Musculoskeletal: He exhibits no edema.   bilat " hands with bouchards/heberden's nodules   Lymphadenopathy:     He has no cervical adenopathy.   Neurological: He is alert and oriented to person, place, and time. He has normal strength. Gait normal.   Skin: Skin is warm and dry. No rash noted.   Psychiatric: He has a normal mood and affect.   Nursing note and vitals reviewed.        Assessment:       1. DDD (degenerative disc disease), lumbar    2. DDD (degenerative disc disease), cervical    3. Mild intermittent asthma without complication    4. RAULITO (obstructive sleep apnea)    5. Eczema, unspecified type    6. Screening for colon cancer    7. Prostate cancer screening    8. Laboratory exam ordered as part of routine general medical examination    9. Encounter for hepatitis C screening test for low risk patient           Plan:       DDD (degenerative disc disease), lumbar  -     gabapentin (NEURONTIN) 300 MG capsule; Take 1 capsule (300 mg total) by mouth 3 (three) times daily.  Dispense: 270 capsule; Refill: 1  -     meloxicam (MOBIC) 15 MG tablet; Take 1 tablet (15 mg total) by mouth once daily.  Dispense: 90 tablet; Refill: 1  -overall stable on current meds    DDD (degenerative disc disease), cervical  -improved after cervical WILLIS last year with Dr. Devine    Mild intermittent asthma without complication  -     albuterol (VENTOLIN HFA) 90 mcg/actuation inhaler; Inhale 2 puffs into the lungs every 4 (four) hours as needed for Wheezing. Rescue  Dispense: 18 g; Refill: 2  -     X-Ray Chest PA And Lateral; Future; Expected date: 03/05/2020  -stable using rescue inhaler p.r.n. denies nocturnal symptoms.  Will send for baseline chest x-ray    RAULITO (obstructive sleep apnea)  -     CPAP/BIPAP SUPPLIES  -stable wearing CPAP nightly, request refills on supplies    Eczema, unspecified type  -     triamcinolone acetonide 0.1% (KENALOG) 0.1 % cream; Apply topically as needed.  Dispense: 454 Bottle; Refill: 1  -stable with p.r.n. steroid use    Screening for colon cancer  -      Ambulatory referral/consult to Gastroenterology; Future; Expected date: 03/12/2020    Prostate cancer screening  -     PSA, Screening; Future; Expected date: 03/05/2020    Laboratory exam ordered as part of routine general medical examination  -     CBC auto differential; Future; Expected date: 03/05/2020  -     Comprehensive metabolic panel; Future; Expected date: 03/05/2020  -     Lipid panel; Future; Expected date: 03/05/2020  -     TSH w/reflex to FT4; Future; Expected date: 03/05/2020  -     Urinalysis, Reflex to Urine Culture Urine, Clean Catch; Future; Expected date: 03/05/2020  -     Microalbumin/creatinine urine ratio; Future; Expected date: 03/05/2020    Encounter for hepatitis C screening test for low risk patient  -     Hepatitis C Ab w/ Rfx to HCV RNA, Quant; Future; Expected date: 03/05/2020      Follow up in about 6 months (around 9/5/2020) for labs within next 2 weeks.        3/5/2020 Zuleima Caballero NP

## 2020-03-05 NOTE — TELEPHONE ENCOUNTER
----- Message from Jennifer Gibbons sent at 3/5/2020 10:39 AM CST -----  Contact: [facility]   [facility] needs all the directions on the patients Rx that was sent over today for billing. [facility] # [phone]

## 2020-06-17 ENCOUNTER — TELEPHONE (OUTPATIENT)
Dept: FAMILY MEDICINE | Facility: CLINIC | Age: 70
End: 2020-06-17

## 2020-08-24 ENCOUNTER — TELEPHONE (OUTPATIENT)
Dept: FAMILY MEDICINE | Facility: CLINIC | Age: 70
End: 2020-08-24

## 2020-08-27 ENCOUNTER — HOSPITAL ENCOUNTER (OUTPATIENT)
Dept: RADIOLOGY | Facility: HOSPITAL | Age: 70
Discharge: HOME | End: 2020-08-27
Attending: NURSE PRACTITIONER
Payer: MEDICARE

## 2020-08-27 DIAGNOSIS — J45.20 MILD INTERMITTENT ASTHMA WITHOUT COMPLICATION: ICD-10-CM

## 2020-08-27 LAB
HCV AB S/CO SERPL IA: 0.01
HCV AB SERPL QL IA: NORMAL
TSH SERPL-ACNC: 1.54 MIU/L (ref 0.4–4.5)

## 2020-08-27 PROCEDURE — 71046 X-RAY EXAM CHEST 2 VIEWS: CPT | Mod: TC,PO

## 2020-09-01 ENCOUNTER — OFFICE VISIT (OUTPATIENT)
Dept: FAMILY MEDICINE | Facility: CLINIC | Age: 70
End: 2020-09-01
Payer: MEDICARE

## 2020-09-01 ENCOUNTER — TELEPHONE (OUTPATIENT)
Dept: FAMILY MEDICINE | Facility: CLINIC | Age: 70
End: 2020-09-01

## 2020-09-01 VITALS
DIASTOLIC BLOOD PRESSURE: 90 MMHG | BODY MASS INDEX: 30.76 KG/M2 | WEIGHT: 196 LBS | SYSTOLIC BLOOD PRESSURE: 164 MMHG | HEART RATE: 92 BPM | HEIGHT: 67 IN

## 2020-09-01 DIAGNOSIS — Z00.00 PHYSICAL EXAM: ICD-10-CM

## 2020-09-01 DIAGNOSIS — Z23 NEED FOR 23-POLYVALENT PNEUMOCOCCAL POLYSACCHARIDE VACCINE: ICD-10-CM

## 2020-09-01 DIAGNOSIS — M51.36 DDD (DEGENERATIVE DISC DISEASE), LUMBAR: ICD-10-CM

## 2020-09-01 DIAGNOSIS — Z12.11 COLON CANCER SCREENING: ICD-10-CM

## 2020-09-01 DIAGNOSIS — J45.20 MILD INTERMITTENT ASTHMA WITHOUT COMPLICATION: ICD-10-CM

## 2020-09-01 DIAGNOSIS — Z12.5 PROSTATE CANCER SCREENING: ICD-10-CM

## 2020-09-01 DIAGNOSIS — I10 ESSENTIAL HYPERTENSION: Primary | ICD-10-CM

## 2020-09-01 DIAGNOSIS — L30.9 ECZEMA, UNSPECIFIED TYPE: ICD-10-CM

## 2020-09-01 PROCEDURE — 1159F PR MEDICATION LIST DOCUMENTED IN MEDICAL RECORD: ICD-10-PCS | Mod: S$GLB,,, | Performed by: NURSE PRACTITIONER

## 2020-09-01 PROCEDURE — 1101F PT FALLS ASSESS-DOCD LE1/YR: CPT | Mod: S$GLB,,, | Performed by: NURSE PRACTITIONER

## 2020-09-01 PROCEDURE — G0009 PNEUMOCOCCAL POLYSACCHARIDE VACCINE 23-VALENT =>2YO SQ IM: ICD-10-PCS | Mod: S$GLB,,, | Performed by: NURSE PRACTITIONER

## 2020-09-01 PROCEDURE — 99397 PR PREVENTIVE VISIT,EST,65 & OVER: ICD-10-PCS | Mod: 25,S$GLB,, | Performed by: NURSE PRACTITIONER

## 2020-09-01 PROCEDURE — 3008F BODY MASS INDEX DOCD: CPT | Mod: S$GLB,,, | Performed by: NURSE PRACTITIONER

## 2020-09-01 PROCEDURE — 90732 PNEUMOCOCCAL POLYSACCHARIDE VACCINE 23-VALENT =>2YO SQ IM: ICD-10-PCS | Mod: S$GLB,,, | Performed by: NURSE PRACTITIONER

## 2020-09-01 PROCEDURE — G0009 ADMIN PNEUMOCOCCAL VACCINE: HCPCS | Mod: S$GLB,,, | Performed by: NURSE PRACTITIONER

## 2020-09-01 PROCEDURE — 1101F PR PT FALLS ASSESS DOC 0-1 FALLS W/OUT INJ PAST YR: ICD-10-PCS | Mod: S$GLB,,, | Performed by: NURSE PRACTITIONER

## 2020-09-01 PROCEDURE — 90732 PPSV23 VACC 2 YRS+ SUBQ/IM: CPT | Mod: S$GLB,,, | Performed by: NURSE PRACTITIONER

## 2020-09-01 PROCEDURE — 1159F MED LIST DOCD IN RCRD: CPT | Mod: S$GLB,,, | Performed by: NURSE PRACTITIONER

## 2020-09-01 PROCEDURE — 3008F PR BODY MASS INDEX (BMI) DOCUMENTED: ICD-10-PCS | Mod: S$GLB,,, | Performed by: NURSE PRACTITIONER

## 2020-09-01 PROCEDURE — 99397 PER PM REEVAL EST PAT 65+ YR: CPT | Mod: 25,S$GLB,, | Performed by: NURSE PRACTITIONER

## 2020-09-01 RX ORDER — HYDROCHLOROTHIAZIDE 25 MG/1
TABLET ORAL
Qty: 30 TABLET | Refills: 5 | Status: SHIPPED | OUTPATIENT
Start: 2020-09-01 | End: 2021-03-01

## 2020-09-01 RX ORDER — ALBUTEROL SULFATE 90 UG/1
2 AEROSOL, METERED RESPIRATORY (INHALATION) EVERY 4 HOURS PRN
Qty: 18 G | Refills: 2 | Status: SHIPPED | OUTPATIENT
Start: 2020-09-01 | End: 2022-05-12 | Stop reason: SDUPTHER

## 2020-09-01 RX ORDER — MELOXICAM 15 MG/1
15 TABLET ORAL DAILY
Qty: 90 TABLET | Refills: 1 | Status: SHIPPED | OUTPATIENT
Start: 2020-09-01 | End: 2021-03-01 | Stop reason: SDUPTHER

## 2020-09-01 RX ORDER — TRIAMCINOLONE ACETONIDE 1 MG/G
CREAM TOPICAL
Qty: 454 BOTTLE | Refills: 1 | Status: SHIPPED | OUTPATIENT
Start: 2020-09-01 | End: 2021-03-01 | Stop reason: SDUPTHER

## 2020-09-01 RX ORDER — GABAPENTIN 300 MG/1
300 CAPSULE ORAL 3 TIMES DAILY
Qty: 270 CAPSULE | Refills: 1 | Status: SHIPPED | OUTPATIENT
Start: 2020-09-01 | End: 2021-03-01 | Stop reason: SDUPTHER

## 2020-09-01 RX ORDER — NEOMYCIN SULFATE, POLYMYXIN B SULFATE AND HYDROCORTISONE 10; 3.5; 1 MG/ML; MG/ML; [USP'U]/ML
3 SUSPENSION/ DROPS AURICULAR (OTIC) 4 TIMES DAILY PRN
Qty: 10 ML | Refills: 0 | Status: SHIPPED | OUTPATIENT
Start: 2020-09-01 | End: 2021-03-01

## 2020-09-01 NOTE — PROGRESS NOTES
SUBJECTIVE:    Patient ID: Tyler Hannah is a 69 y.o. male.    Chief Complaint: Medication Refill (brought bottles, C-scope referral put in on 3/5/20, PNA 23 wants// SW)    Pt here for regular f/u. Reports overall doing well,. Admits has been eating a lot of boiled shrimp the past few days.  Patient reports was told years ago he had hypertension and was treated with Altace.  States he did not tolerate medicine and does not want to take any blood pressure medication.  He occasionally checks his blood pressure at home and it runs in the 140-150 range which he feels is fine      Office Visit on 03/05/2020   Component Date Value Ref Range Status    TSH w/reflex to FT4 08/26/2020 1.54  0.40 - 4.50 mIU/L Final    Hepatitis C Ab 08/26/2020 NON-REACTIVE  NON-REACTIVE Final    Signal/Cutoff 08/26/2020 0.01  <1.00 Final       Past Medical History:   Diagnosis Date    Arthritis     Asthma     Family history of colon cancer in mother 3/5/2020     Past Surgical History:   Procedure Laterality Date    right arm      TRANSFORAMINAL EPIDURAL INJECTION OF STEROID Right 5/21/2019    Procedure: Injection,steroid,epidural,transforaminal approach;  Surgeon: Blade Devine MD;  [facility];  Service: Pain Management;  Laterality: Right;  C6, C7     Family History   Problem Relation Age of Onset    Cancer Mother     Diabetes Father     Heart disease Father     No Known Problems Sister        Marital Status:   Alcohol History:  reports current alcohol use.  Tobacco History:  reports that he has never smoked. He has never used smokeless tobacco.  Drug History:  has no history on file for drug.    The patient has no Health Maintenance topics of status Not Due  Immunization History   Administered Date(s) Administered    Influenza (FLUAD) - Trivalent - Adjuvanted - PF (65+) 11/14/2018    Influenza - High Dose - PF (65 years and older) 10/23/2019    Pneumococcal Conjugate - 13 Valent 06/04/2019    Pneumococcal  Polysaccharide - 23 Valent 09/01/2020       Review of patient's allergies indicates:  No Known Allergies    Current Outpatient Medications:     albuterol (VENTOLIN HFA) 90 mcg/actuation inhaler, Inhale 2 puffs into the lungs every 4 (four) hours as needed for Wheezing. Rescue, Disp: 18 g, Rfl: 2    fluticasone propionate (FLONASE) 50 mcg/actuation nasal spray, 1 spray by Each Nostril route once daily., Disp: , Rfl:     gabapentin (NEURONTIN) 300 MG capsule, Take 1 capsule (300 mg total) by mouth 3 (three) times daily., Disp: 270 capsule, Rfl: 1    meloxicam (MOBIC) 15 MG tablet, Take 1 tablet (15 mg total) by mouth once daily., Disp: 90 tablet, Rfl: 1    triamcinolone acetonide 0.1% (KENALOG) 0.1 % cream, Apply topically as needed., Disp: 454 Bottle, Rfl: 1    hydroCHLOROthiazide (HYDRODIURIL) 25 MG tablet, Take daily for 3 days then every other day, Disp: 30 tablet, Rfl: 5    neomycin-polymyxin-hydrocortisone (CORTISPORIN) 3.5-10,000-1 mg/mL-unit/mL-% otic suspension, Place 3 drops into both ears 4 (four) times daily as needed (ear ache/infection)., Disp: 10 mL, Rfl: 0    Review of Systems   Constitutional: Negative for appetite change, chills and fever.   Respiratory: Negative for cough, shortness of breath and wheezing.    Cardiovascular: Negative for chest pain, palpitations and leg swelling.   Gastrointestinal: Negative for abdominal pain, constipation and diarrhea.   Genitourinary: Negative for dysuria, frequency and hematuria.   Musculoskeletal: Positive for arthralgias (bilat hands) and back pain (across lower back, no longer has sciatica pain). Negative for gait problem and neck pain.   Skin: Negative for rash.   Neurological: Negative for dizziness, syncope and numbness.   Psychiatric/Behavioral: Negative for dysphoric mood. The patient is not nervous/anxious.           Objective:      Vitals:    09/01/20 0825 09/01/20 0827 09/01/20 0840   BP: (!) 170/84 (!) 200/98 (!) 164/90   Pulse: 92     Weight:  "88.9 kg (196 lb)     Height: 5' 6.5" (1.689 m)       Physical Exam  Vitals signs and nursing note reviewed.   Constitutional:       General: He is not in acute distress.     Appearance: Normal appearance. He is well-developed and normal weight.   HENT:      Head: Normocephalic and atraumatic.      Right Ear: Tympanic membrane and ear canal normal.      Left Ear: Tympanic membrane and ear canal normal.   Neck:      Musculoskeletal: Neck supple.      Vascular: No carotid bruit.   Cardiovascular:      Rate and Rhythm: Normal rate and regular rhythm.      Heart sounds: No murmur. No friction rub. No gallop.    Pulmonary:      Effort: Pulmonary effort is normal. No respiratory distress.      Breath sounds: No wheezing or rales.   Abdominal:      General: There is no distension.      Palpations: Abdomen is soft.      Tenderness: There is no abdominal tenderness.   Musculoskeletal:      Right lower leg: No edema.      Left lower leg: No edema.   Lymphadenopathy:      Cervical: No cervical adenopathy.   Skin:     General: Skin is warm and dry.      Findings: No rash.   Neurological:      General: No focal deficit present.      Mental Status: He is alert and oriented to person, place, and time.      Gait: Gait normal.           Assessment:       1. Essential hypertension    2. DDD (degenerative disc disease), lumbar    3. Mild intermittent asthma without complication    4. Eczema, unspecified type    5. Colon cancer screening    6. Need for 23-polyvalent pneumococcal polysaccharide vaccine    7. Prostate cancer screening    8. Physical exam           Plan:       Essential hypertension  Comments:  Patient advised given elevated blood pressure recommend treatment which he is very hesitant to consider.  Pt cautioned on risks with uncontrolled HTN and he agrees to "try" HCTZ though refuses Ace or calcium channel blocker-monitor BP at home and return in 2 weeks for recheck  Orders:  -     hydroCHLOROthiazide (HYDRODIURIL) 25 MG " tablet; Take daily for 3 days then every other day  Dispense: 30 tablet; Refill: 5    DDD (degenerative disc disease), lumbar  Comments:  Stable on med  Orders:  -     gabapentin (NEURONTIN) 300 MG capsule; Take 1 capsule (300 mg total) by mouth 3 (three) times daily.  Dispense: 270 capsule; Refill: 1  -     meloxicam (MOBIC) 15 MG tablet; Take 1 tablet (15 mg total) by mouth once daily.  Dispense: 90 tablet; Refill: 1  -     CBC auto differential; Future; Expected date: 09/01/2020  -     Comprehensive metabolic panel; Future; Expected date: 09/01/2020  -     Lipid Panel; Future; Expected date: 09/01/2020  -     TSH w/reflex to FT4; Future; Expected date: 09/01/2020  -     Microalbumin/creatinine urine ratio; Future; Expected date: 09/01/2020  -     Urinalysis, Reflex to Urine Culture Urine, Clean Catch; Future; Expected date: 09/01/2020    Mild intermittent asthma without complication  Comments:  Well controlled  Orders:  -     albuterol (VENTOLIN HFA) 90 mcg/actuation inhaler; Inhale 2 puffs into the lungs every 4 (four) hours as needed for Wheezing. Rescue  Dispense: 18 g; Refill: 2    Eczema, unspecified type  Comments:  Stable  Orders:  -     triamcinolone acetonide 0.1% (KENALOG) 0.1 % cream; Apply topically as needed.  Dispense: 454 Bottle; Refill: 1    Colon cancer screening  Comments:  Patient encouraged to call and schedule appointment with GI for follow-up C scope    Need for 23-polyvalent pneumococcal polysaccharide vaccine  -     Pneumococcal Polysaccharide Vaccine (23 Valent) (SQ/IM)    Prostate cancer screening  -     PSA, Screening; Future; Expected date: 09/01/2020    Physical exam  Comments:  Not all his labs were drawn last week so patient will have them drawn today-will call with results  Orders:  -     CBC auto differential; Future; Expected date: 09/01/2020  -     Comprehensive metabolic panel; Future; Expected date: 09/01/2020  -     Lipid Panel; Future; Expected date: 09/01/2020  -     TSH  w/reflex to FT4; Future; Expected date: 09/01/2020  -     Microalbumin/creatinine urine ratio; Future; Expected date: 09/01/2020  -     Urinalysis, Reflex to Urine Culture Urine, Clean Catch; Future; Expected date: 09/01/2020    Other orders  -     neomycin-polymyxin-hydrocortisone (CORTISPORIN) 3.5-10,000-1 mg/mL-unit/mL-% otic suspension; Place 3 drops into both ears 4 (four) times daily as needed (ear ache/infection).  Dispense: 10 mL; Refill: 0      Follow up in about 6 months (around 3/1/2021) for HTN, nurse visit in 2 weeks for BP check, labs to be drawn today.        9/1/2020 Zuleima Caballero NP

## 2020-09-01 NOTE — TELEPHONE ENCOUNTER
----- Message from Margaux Carter sent at 9/1/2020  8:43 AM CDT -----  Regarding: med clarification  [facility] is needing clarification on a medication   Pharm tech [phone]

## 2020-09-01 NOTE — PATIENT INSTRUCTIONS
Established High Blood Pressure    High blood pressure (hypertension) is a chronic disease. Often, healthcare providers dont know what causes it. But it can be caused by certain health conditions and medicines.  If you have high blood pressure, you may not have any symptoms. If you do have symptoms, they may include headache, dizziness, changes in your vision, chest pain, and shortness of breath. But even without symptoms, high blood pressure thats not treated raises your risk for heart attack and stroke. High blood pressure is a serious health risk and shouldnt be ignored.  A blood pressure reading is made up of two numbers: a higher number over a lower number. The top number is the systolic pressure. The bottom number is the diastolic pressure. A normal blood pressure is a systolic pressure of  less than 120 over a diastolic pressure of less than 80. You will see your blood pressure readings written together. For example, a person with a systolic pressure of 188 and a diastolic pressure of 78 will have 118/78 written in the medical record.  High blood pressure is when either the top number is 140 or higher, or the bottom number is 90 or higher. This must be the result when taking your blood pressure a number of times. The blood pressures between normal and high are called prehypertension.  Home care  If you have high blood pressure, you should do what is listed below to lower your blood pressure. If you are taking medicines for high blood pressure, these methods may reduce or end your need for medicines in the future.  · Begin a weight-loss program if you are overweight.  · Cut back on how much salt you get in your diet. Heres how to do this:  ¨ Dont eat foods that have a lot of salt. These include olives, pickles, smoked meats, and salted potato chips.  ¨ Dont add salt to your food at the table.  ¨ Use only small amounts of salt when cooking.  · Start an exercise program. Talk with your healthcare  provider about the type of exercise program that would be best for you. It doesn't have to be hard. Even brisk walking for 20 minutes 3 times a week is a good form of exercise.  · Dont take medicines that stimulate the heart. This includes many over-the-counter cold and sinus decongestant pills and sprays, as well as diet pills. Check the warnings about hypertension on the label. Before buying any over-the-counter medicines or supplements, always ask the pharmacist about the product's potential interaction with your high blood pressure and your high blood pressure medicines.  · Stimulants such as amphetamine or cocaine could be deadly for someone with high blood pressure. Never take these.  · Limit how much caffeine you get in your diet. Switch to caffeine-free products.  · Stop smoking. If you are a long-time smoker, this can be hard. Talk to your healthcare provider about medicines and nicotine replacement options to help you. Also, enroll in a stop-smoking program to make it more likely that you will quit for good.  · Learn how to handle stress. This is an important part of any program to lower blood pressure. Learn about relaxation methods like meditation, yoga, or biofeedback.  · If your provider prescribed medicines, take them exactly as directed. Missing doses may cause your blood pressure get out of control.  · If you miss a dose or doses, check with your healthcare provider or pharmacist about what to do.  · Consider buying an automatic blood pressure machine. Ask your provider for a recommendation. You can get one of these at most pharmacies.     The American Heart Association recommends the following guidelines for home blood pressure monitoring:  · Don't smoke or drink coffee for 30 minutes before taking your blood pressure.  · Go to the bathroom before the test.  · Relax for 5 minutes before taking the measurement.  · Sit with your back supported (don't sit on a couch or soft chair); keep your feet on  the floor uncrossed. Place your arm on a solid flat surface (like a table) with the upper part of the arm at heart level. Place the middle of the cuff directly above the eye of the elbow. Check the monitor's instruction manual for an illustration.  · Take multiple readings. When you measure, take 2 to 3 readings one minute apart and record all of the results.  · Take your blood pressure at the same time every day, or as your healthcare provider recommends.  · Record the date, time, and blood pressure reading.  · Take the record with you to your next medical appointment. If your blood pressure monitor has a built-in memory, simply take the monitor with you to your next appointment.  · Call your provider if you have several high readings. Don't be frightened by a single high blood pressure reading, but if you get several high readings, check in with your healthcare provider.  · Note: When blood pressure reaches a systolic (top number) of 180 or higher OR diastolic (bottom number) of 110 or higher, seek emergency medical treatment.  Follow-up care  You will need to see your healthcare provider regularly. This is to check your blood pressure and to make changes to your medicines. Make a follow-up appointment as directed. Bring the record of your home blood pressure readings to the appointment.  When to seek medical advice  Call your healthcare provider right away if any of these occur:  · Blood pressure reaches a systolic (upper number) of 180 or higher OR a diastolic (bottom number) of 110 or higher  · Chest pain or shortness of breath  · Severe headache  · Throbbing or rushing sound in the ears  · Nosebleed  · Sudden severe pain in your belly (abdomen)  · Extreme drowsiness, confusion, or fainting  · Dizziness or spinning sensation (vertigo)  · Weakness of an arm or leg or one side of the face  · You have problems speaking or seeing   Date Last Reviewed: 12/1/2016  © 3410-5627 [employer]. [address] [place] [address]  [place] [address]. All rights reserved. This information is not intended as a substitute for professional medical care. Always follow your healthcare professional's instructions.

## 2020-09-01 NOTE — TELEPHONE ENCOUNTER
Spoke to someone from from [facility]. Pharmacist stated she just needed directions on pts kenalog cream. Let pharmacist know that cream bid prn

## 2020-09-02 LAB
ALBUMIN SERPL-MCNC: 4.7 G/DL (ref 3.6–5.1)
ALBUMIN/CREAT UR: 9 MCG/MG CREAT
ALBUMIN/GLOB SERPL: 1.8 (CALC) (ref 1–2.5)
ALP SERPL-CCNC: 40 U/L (ref 35–144)
ALT SERPL-CCNC: 14 U/L (ref 9–46)
APPEARANCE UR: CLEAR
AST SERPL-CCNC: 19 U/L (ref 10–35)
BACTERIA #/AREA URNS HPF: NORMAL /HPF
BACTERIA UR CULT: NORMAL
BASOPHILS # BLD AUTO: 21 CELLS/UL (ref 0–200)
BASOPHILS NFR BLD AUTO: 0.5 %
BILIRUB SERPL-MCNC: 0.4 MG/DL (ref 0.2–1.2)
BILIRUB UR QL STRIP: NEGATIVE
BUN SERPL-MCNC: 11 MG/DL (ref 7–25)
BUN/CREAT SERPL: ABNORMAL (CALC) (ref 6–22)
CALCIUM SERPL-MCNC: 9.7 MG/DL (ref 8.6–10.3)
CHLORIDE SERPL-SCNC: 104 MMOL/L (ref 98–110)
CHOLEST SERPL-MCNC: 238 MG/DL
CHOLEST/HDLC SERPL: 4.8 (CALC)
CO2 SERPL-SCNC: 26 MMOL/L (ref 20–32)
COLOR UR: YELLOW
CREAT SERPL-MCNC: 0.81 MG/DL (ref 0.7–1.25)
CREAT UR-MCNC: 34 MG/DL (ref 20–320)
EOSINOPHIL # BLD AUTO: 139 CELLS/UL (ref 15–500)
EOSINOPHIL NFR BLD AUTO: 3.3 %
ERYTHROCYTE [DISTWIDTH] IN BLOOD BY AUTOMATED COUNT: 12.7 % (ref 11–15)
GFRSERPLBLD MDRD-ARVRAT: 92 ML/MIN/1.73M2
GLOBULIN SER CALC-MCNC: 2.6 G/DL (CALC) (ref 1.9–3.7)
GLUCOSE SERPL-MCNC: 109 MG/DL (ref 65–99)
GLUCOSE UR QL STRIP: NEGATIVE
HCT VFR BLD AUTO: 49.8 % (ref 38.5–50)
HDLC SERPL-MCNC: 50 MG/DL
HGB BLD-MCNC: 16.4 G/DL (ref 13.2–17.1)
HGB UR QL STRIP: NEGATIVE
HYALINE CASTS #/AREA URNS LPF: NORMAL /LPF
KETONES UR QL STRIP: NEGATIVE
LDLC SERPL CALC-MCNC: 145 MG/DL (CALC)
LEUKOCYTE ESTERASE UR QL STRIP: NEGATIVE
LYMPHOCYTES # BLD AUTO: 764 CELLS/UL (ref 850–3900)
LYMPHOCYTES NFR BLD AUTO: 18.2 %
MCH RBC QN AUTO: 30.5 PG (ref 27–33)
MCHC RBC AUTO-ENTMCNC: 32.9 G/DL (ref 32–36)
MCV RBC AUTO: 92.7 FL (ref 80–100)
MICROALBUMIN UR-MCNC: 0.3 MG/DL
MONOCYTES # BLD AUTO: 328 CELLS/UL (ref 200–950)
MONOCYTES NFR BLD AUTO: 7.8 %
NEUTROPHILS # BLD AUTO: 2948 CELLS/UL (ref 1500–7800)
NEUTROPHILS NFR BLD AUTO: 70.2 %
NITRITE UR QL STRIP: NEGATIVE
NONHDLC SERPL-MCNC: 188 MG/DL (CALC)
PH UR STRIP: 7.5 [PH] (ref 5–8)
PLATELET # BLD AUTO: 164 THOUSAND/UL (ref 140–400)
PMV BLD REES-ECKER: 10.3 FL (ref 7.5–12.5)
POTASSIUM SERPL-SCNC: 4.1 MMOL/L (ref 3.5–5.3)
PROT SERPL-MCNC: 7.3 G/DL (ref 6.1–8.1)
PROT UR QL STRIP: NEGATIVE
PSA SERPL-MCNC: 1.2 NG/ML
RBC # BLD AUTO: 5.37 MILLION/UL (ref 4.2–5.8)
RBC #/AREA URNS HPF: NORMAL /HPF
SODIUM SERPL-SCNC: 139 MMOL/L (ref 135–146)
SP GR UR STRIP: 1.01 (ref 1–1.03)
SQUAMOUS #/AREA URNS HPF: NORMAL /HPF
TRIGL SERPL-MCNC: 299 MG/DL
TSH SERPL-ACNC: 1.36 MIU/L (ref 0.4–4.5)
WBC # BLD AUTO: 4.2 THOUSAND/UL (ref 3.8–10.8)
WBC #/AREA URNS HPF: NORMAL /HPF

## 2020-09-03 ENCOUNTER — TELEPHONE (OUTPATIENT)
Dept: FAMILY MEDICINE | Facility: CLINIC | Age: 70
End: 2020-09-03

## 2020-09-03 NOTE — PROGRESS NOTES
Spoke to patient with results verbatim per Zuleima. States he took statin in the past and it made him have severe muscle pains. I added this as allergy to chart. I asked patient if he would be willing to take something that wasn't a statin and he declined. States he won't take any medication for cholesterol. Also wanted Zuleima to know that his blood pressure was down once he got home.

## 2020-09-03 NOTE — PROGRESS NOTES
Please call patient and let him know recent labs show his cholesterol levels are elevated.  His bad cholesterol LDL is elevated at 145 and should be less than 100 given elevated blood pressure.  Triglycerides are elevated at 299 and should be less than 150.  Based on these numbers and his blood pressure his risk of a cardiovascular event (stroke, mini stroke, heart attack) within the next 10 years is calculated at 28% which is considered high risk and I recommend adding cholesterol medication in addition to low-fat diet.  If he agrees then start atorvastatin 20 mg daily and repeat lipids and CMP in 3 months.  Blood sugar few points elevated at 109 otherwise the rest of his labs look good.  Kidney, liver, thyroid, blood count, prostate and urine all within normal range

## 2020-09-03 NOTE — TELEPHONE ENCOUNTER
----- Message from Zuleima Caballero NP sent at 9/3/2020  8:38 AM CDT -----  Please call patient and let him know recent labs show his cholesterol levels are elevated.  His bad cholesterol LDL is elevated at 145 and should be less than 100 given elevated blood pressure.  Triglycerides are elevated at 299 and should be less than 150.  Based on these numbers and his blood pressure his risk of a cardiovascular event (stroke, mini stroke, heart attack) within the next 10 years is calculated at 28% which is considered high risk and I recommend adding cholesterol medication in addition to low-fat diet.  If he agrees then start atorvastatin 20 mg daily and repeat lipids and CMP in 3 months.  Blood sugar few points elevated at 109 otherwise the rest of his labs look good.  Kidney, liver, thyroid, blood count, prostate and urine all within normal range

## 2021-02-17 ENCOUNTER — TELEPHONE (OUTPATIENT)
Dept: FAMILY MEDICINE | Facility: CLINIC | Age: 71
End: 2021-02-17

## 2021-02-17 DIAGNOSIS — Z79.899 ENCOUNTER FOR LONG-TERM (CURRENT) USE OF MEDICATIONS: Primary | ICD-10-CM

## 2021-03-01 ENCOUNTER — OFFICE VISIT (OUTPATIENT)
Dept: FAMILY MEDICINE | Facility: CLINIC | Age: 71
End: 2021-03-01
Payer: MEDICARE

## 2021-03-01 VITALS
BODY MASS INDEX: 31.23 KG/M2 | HEIGHT: 67 IN | HEART RATE: 92 BPM | DIASTOLIC BLOOD PRESSURE: 78 MMHG | OXYGEN SATURATION: 99 % | WEIGHT: 199 LBS | SYSTOLIC BLOOD PRESSURE: 138 MMHG

## 2021-03-01 DIAGNOSIS — R03.0 ELEVATED BLOOD PRESSURE READING: ICD-10-CM

## 2021-03-01 DIAGNOSIS — Z12.11 SCREEN FOR COLON CANCER: ICD-10-CM

## 2021-03-01 DIAGNOSIS — M51.36 DDD (DEGENERATIVE DISC DISEASE), LUMBAR: Primary | ICD-10-CM

## 2021-03-01 DIAGNOSIS — Z12.5 PROSTATE CANCER SCREENING: ICD-10-CM

## 2021-03-01 DIAGNOSIS — E78.5 DYSLIPIDEMIA: ICD-10-CM

## 2021-03-01 DIAGNOSIS — L30.9 ECZEMA, UNSPECIFIED TYPE: ICD-10-CM

## 2021-03-01 PROCEDURE — 3078F DIAST BP <80 MM HG: CPT | Mod: S$GLB,,, | Performed by: NURSE PRACTITIONER

## 2021-03-01 PROCEDURE — 3008F BODY MASS INDEX DOCD: CPT | Mod: S$GLB,,, | Performed by: NURSE PRACTITIONER

## 2021-03-01 PROCEDURE — 3078F PR MOST RECENT DIASTOLIC BLOOD PRESSURE < 80 MM HG: ICD-10-PCS | Mod: S$GLB,,, | Performed by: NURSE PRACTITIONER

## 2021-03-01 PROCEDURE — 99214 PR OFFICE/OUTPT VISIT, EST, LEVL IV, 30-39 MIN: ICD-10-PCS | Mod: S$GLB,,, | Performed by: NURSE PRACTITIONER

## 2021-03-01 PROCEDURE — 99214 OFFICE O/P EST MOD 30 MIN: CPT | Mod: S$GLB,,, | Performed by: NURSE PRACTITIONER

## 2021-03-01 PROCEDURE — 3075F PR MOST RECENT SYSTOLIC BLOOD PRESS GE 130-139MM HG: ICD-10-PCS | Mod: S$GLB,,, | Performed by: NURSE PRACTITIONER

## 2021-03-01 PROCEDURE — 3075F SYST BP GE 130 - 139MM HG: CPT | Mod: S$GLB,,, | Performed by: NURSE PRACTITIONER

## 2021-03-01 PROCEDURE — 1159F MED LIST DOCD IN RCRD: CPT | Mod: S$GLB,,, | Performed by: NURSE PRACTITIONER

## 2021-03-01 PROCEDURE — 3008F PR BODY MASS INDEX (BMI) DOCUMENTED: ICD-10-PCS | Mod: S$GLB,,, | Performed by: NURSE PRACTITIONER

## 2021-03-01 PROCEDURE — 1159F PR MEDICATION LIST DOCUMENTED IN MEDICAL RECORD: ICD-10-PCS | Mod: S$GLB,,, | Performed by: NURSE PRACTITIONER

## 2021-03-01 RX ORDER — TRIAMCINOLONE ACETONIDE 1 MG/G
CREAM TOPICAL
Qty: 454 G | Refills: 1 | Status: SHIPPED | OUTPATIENT
Start: 2021-03-01

## 2021-03-01 RX ORDER — MELOXICAM 15 MG/1
15 TABLET ORAL DAILY
Qty: 90 TABLET | Refills: 3 | Status: SHIPPED | OUTPATIENT
Start: 2021-03-01 | End: 2022-05-12 | Stop reason: SDUPTHER

## 2021-03-01 RX ORDER — GABAPENTIN 300 MG/1
300 CAPSULE ORAL 3 TIMES DAILY
Qty: 270 CAPSULE | Refills: 3 | Status: SHIPPED | OUTPATIENT
Start: 2021-03-01 | End: 2022-05-12 | Stop reason: SDUPTHER

## 2021-03-09 ENCOUNTER — TELEPHONE (OUTPATIENT)
Dept: FAMILY MEDICINE | Facility: CLINIC | Age: 71
End: 2021-03-09

## 2021-03-15 LAB — NONINV COLON CA DNA+OCC BLD SCRN STL QL: NEGATIVE

## 2021-03-23 ENCOUNTER — TELEPHONE (OUTPATIENT)
Dept: FAMILY MEDICINE | Facility: CLINIC | Age: 71
End: 2021-03-23

## 2021-04-16 ENCOUNTER — IMMUNIZATION (OUTPATIENT)
Dept: PRIMARY CARE CLINIC | Facility: CLINIC | Age: 71
End: 2021-04-16
Payer: MEDICARE

## 2021-04-16 DIAGNOSIS — Z23 NEED FOR VACCINATION: Primary | ICD-10-CM

## 2021-04-16 PROCEDURE — 91300 COVID-19, MRNA, LNP-S, PF, 30 MCG/0.3 ML DOSE VACCINE: ICD-10-PCS | Mod: S$GLB,,, | Performed by: NURSE PRACTITIONER

## 2021-04-16 PROCEDURE — 0001A COVID-19, MRNA, LNP-S, PF, 30 MCG/0.3 ML DOSE VACCINE: CPT | Mod: CV19,S$GLB,, | Performed by: NURSE PRACTITIONER

## 2021-04-16 PROCEDURE — 91300 COVID-19, MRNA, LNP-S, PF, 30 MCG/0.3 ML DOSE VACCINE: CPT | Mod: S$GLB,,, | Performed by: NURSE PRACTITIONER

## 2021-04-16 PROCEDURE — 0001A COVID-19, MRNA, LNP-S, PF, 30 MCG/0.3 ML DOSE VACCINE: ICD-10-PCS | Mod: CV19,S$GLB,, | Performed by: NURSE PRACTITIONER

## 2021-05-07 ENCOUNTER — IMMUNIZATION (OUTPATIENT)
Dept: PRIMARY CARE CLINIC | Facility: CLINIC | Age: 71
End: 2021-05-07
Payer: MEDICARE

## 2021-05-07 DIAGNOSIS — Z23 NEED FOR VACCINATION: Primary | ICD-10-CM

## 2021-05-07 PROCEDURE — 91300 COVID-19, MRNA, LNP-S, PF, 30 MCG/0.3 ML DOSE VACCINE: ICD-10-PCS | Mod: S$GLB,,, | Performed by: FAMILY MEDICINE

## 2021-05-07 PROCEDURE — 0002A COVID-19, MRNA, LNP-S, PF, 30 MCG/0.3 ML DOSE VACCINE: CPT | Mod: CV19,S$GLB,, | Performed by: FAMILY MEDICINE

## 2021-05-07 PROCEDURE — 0002A COVID-19, MRNA, LNP-S, PF, 30 MCG/0.3 ML DOSE VACCINE: ICD-10-PCS | Mod: CV19,S$GLB,, | Performed by: FAMILY MEDICINE

## 2021-05-07 PROCEDURE — 91300 COVID-19, MRNA, LNP-S, PF, 30 MCG/0.3 ML DOSE VACCINE: CPT | Mod: S$GLB,,, | Performed by: FAMILY MEDICINE

## 2021-09-23 ENCOUNTER — TELEPHONE (OUTPATIENT)
Dept: FAMILY MEDICINE | Facility: CLINIC | Age: 71
End: 2021-09-23

## 2021-09-28 LAB
ALBUMIN SERPL-MCNC: 4.8 G/DL (ref 3.6–5.1)
ALBUMIN/CREAT UR: 6 MCG/MG CREAT
ALBUMIN/GLOB SERPL: 1.8 (CALC) (ref 1–2.5)
ALP SERPL-CCNC: 50 U/L (ref 35–144)
ALT SERPL-CCNC: 11 U/L (ref 9–46)
APPEARANCE UR: CLEAR
AST SERPL-CCNC: 18 U/L (ref 10–35)
BACTERIA #/AREA URNS HPF: NORMAL /HPF
BACTERIA UR CULT: NORMAL
BASOPHILS # BLD AUTO: 30 CELLS/UL (ref 0–200)
BASOPHILS NFR BLD AUTO: 0.5 %
BILIRUB SERPL-MCNC: 0.7 MG/DL (ref 0.2–1.2)
BILIRUB UR QL STRIP: NEGATIVE
BUN SERPL-MCNC: 14 MG/DL (ref 7–25)
BUN/CREAT SERPL: ABNORMAL (CALC) (ref 6–22)
CALCIUM SERPL-MCNC: 9.7 MG/DL (ref 8.6–10.3)
CHLORIDE SERPL-SCNC: 102 MMOL/L (ref 98–110)
CHOLEST SERPL-MCNC: 221 MG/DL
CHOLEST/HDLC SERPL: 4.4 (CALC)
CO2 SERPL-SCNC: 29 MMOL/L (ref 20–32)
COLOR UR: YELLOW
CREAT SERPL-MCNC: 0.78 MG/DL (ref 0.7–1.25)
CREAT UR-MCNC: 116 MG/DL (ref 20–320)
EOSINOPHIL # BLD AUTO: 236 CELLS/UL (ref 15–500)
EOSINOPHIL NFR BLD AUTO: 4 %
ERYTHROCYTE [DISTWIDTH] IN BLOOD BY AUTOMATED COUNT: 12.9 % (ref 11–15)
GLOBULIN SER CALC-MCNC: 2.7 G/DL (CALC) (ref 1.9–3.7)
GLUCOSE SERPL-MCNC: 102 MG/DL (ref 65–99)
GLUCOSE UR QL STRIP: NEGATIVE
HCT VFR BLD AUTO: 49.8 % (ref 38.5–50)
HDLC SERPL-MCNC: 50 MG/DL
HGB BLD-MCNC: 16.7 G/DL (ref 13.2–17.1)
HGB UR QL STRIP: NEGATIVE
HYALINE CASTS #/AREA URNS LPF: NORMAL /LPF
KETONES UR QL STRIP: NEGATIVE
LDLC SERPL CALC-MCNC: 135 MG/DL (CALC)
LEUKOCYTE ESTERASE UR QL STRIP: NEGATIVE
LYMPHOCYTES # BLD AUTO: 714 CELLS/UL (ref 850–3900)
LYMPHOCYTES NFR BLD AUTO: 12.1 %
MCH RBC QN AUTO: 31.2 PG (ref 27–33)
MCHC RBC AUTO-ENTMCNC: 33.5 G/DL (ref 32–36)
MCV RBC AUTO: 92.9 FL (ref 80–100)
MICROALBUMIN UR-MCNC: 0.7 MG/DL
MONOCYTES # BLD AUTO: 407 CELLS/UL (ref 200–950)
MONOCYTES NFR BLD AUTO: 6.9 %
NEUTROPHILS # BLD AUTO: 4514 CELLS/UL (ref 1500–7800)
NEUTROPHILS NFR BLD AUTO: 76.5 %
NITRITE UR QL STRIP: NEGATIVE
NONHDLC SERPL-MCNC: 171 MG/DL (CALC)
PH UR STRIP: 7 [PH] (ref 5–8)
PLATELET # BLD AUTO: 179 THOUSAND/UL (ref 140–400)
PMV BLD REES-ECKER: 10.7 FL (ref 7.5–12.5)
POTASSIUM SERPL-SCNC: 4.6 MMOL/L (ref 3.5–5.3)
PROT SERPL-MCNC: 7.5 G/DL (ref 6.1–8.1)
PROT UR QL STRIP: NEGATIVE
PSA SERPL-MCNC: 1.24 NG/ML
RBC # BLD AUTO: 5.36 MILLION/UL (ref 4.2–5.8)
RBC #/AREA URNS HPF: NORMAL /HPF
SODIUM SERPL-SCNC: 140 MMOL/L (ref 135–146)
SP GR UR STRIP: 1.02 (ref 1–1.03)
SQUAMOUS #/AREA URNS HPF: NORMAL /HPF
TRIGL SERPL-MCNC: 224 MG/DL
TSH SERPL-ACNC: 1.65 MIU/L (ref 0.4–4.5)
WBC # BLD AUTO: 5.9 THOUSAND/UL (ref 3.8–10.8)
WBC #/AREA URNS HPF: NORMAL /HPF

## 2021-09-30 ENCOUNTER — TELEPHONE (OUTPATIENT)
Dept: FAMILY MEDICINE | Facility: CLINIC | Age: 71
End: 2021-09-30

## 2022-05-09 DIAGNOSIS — E78.5 DYSLIPIDEMIA: Primary | ICD-10-CM

## 2022-05-10 LAB
ALBUMIN SERPL-MCNC: 4.5 G/DL (ref 3.6–5.1)
ALBUMIN/GLOB SERPL: 1.9 (CALC) (ref 1–2.5)
ALP SERPL-CCNC: 45 U/L (ref 35–144)
ALT SERPL-CCNC: 11 U/L (ref 9–46)
AST SERPL-CCNC: 17 U/L (ref 10–35)
BASOPHILS # BLD AUTO: 29 CELLS/UL (ref 0–200)
BASOPHILS NFR BLD AUTO: 0.6 %
BILIRUB SERPL-MCNC: 0.6 MG/DL (ref 0.2–1.2)
BUN SERPL-MCNC: 17 MG/DL (ref 7–25)
BUN/CREAT SERPL: NORMAL (CALC) (ref 6–22)
CALCIUM SERPL-MCNC: 9.1 MG/DL (ref 8.6–10.3)
CHLORIDE SERPL-SCNC: 104 MMOL/L (ref 98–110)
CHOLEST SERPL-MCNC: 193 MG/DL
CHOLEST/HDLC SERPL: 5.1 (CALC)
CO2 SERPL-SCNC: 27 MMOL/L (ref 20–32)
CREAT SERPL-MCNC: 0.72 MG/DL (ref 0.7–1.25)
EOSINOPHIL # BLD AUTO: 255 CELLS/UL (ref 15–500)
EOSINOPHIL NFR BLD AUTO: 5.2 %
ERYTHROCYTE [DISTWIDTH] IN BLOOD BY AUTOMATED COUNT: 12.4 % (ref 11–15)
GLOBULIN SER CALC-MCNC: 2.4 G/DL (CALC) (ref 1.9–3.7)
GLUCOSE SERPL-MCNC: 99 MG/DL (ref 65–99)
HCT VFR BLD AUTO: 48.1 % (ref 38.5–50)
HDLC SERPL-MCNC: 38 MG/DL
HGB BLD-MCNC: 16 G/DL (ref 13.2–17.1)
LDLC SERPL CALC-MCNC: 117 MG/DL (CALC)
LYMPHOCYTES # BLD AUTO: 941 CELLS/UL (ref 850–3900)
LYMPHOCYTES NFR BLD AUTO: 19.2 %
MCH RBC QN AUTO: 30.2 PG (ref 27–33)
MCHC RBC AUTO-ENTMCNC: 33.3 G/DL (ref 32–36)
MCV RBC AUTO: 90.9 FL (ref 80–100)
MONOCYTES # BLD AUTO: 323 CELLS/UL (ref 200–950)
MONOCYTES NFR BLD AUTO: 6.6 %
NEUTROPHILS # BLD AUTO: 3352 CELLS/UL (ref 1500–7800)
NEUTROPHILS NFR BLD AUTO: 68.4 %
NONHDLC SERPL-MCNC: 155 MG/DL (CALC)
PLATELET # BLD AUTO: 161 THOUSAND/UL (ref 140–400)
PMV BLD REES-ECKER: 10.2 FL (ref 7.5–12.5)
POTASSIUM SERPL-SCNC: 4.2 MMOL/L (ref 3.5–5.3)
PROT SERPL-MCNC: 6.9 G/DL (ref 6.1–8.1)
RBC # BLD AUTO: 5.29 MILLION/UL (ref 4.2–5.8)
SODIUM SERPL-SCNC: 139 MMOL/L (ref 135–146)
TRIGL SERPL-MCNC: 263 MG/DL
WBC # BLD AUTO: 4.9 THOUSAND/UL (ref 3.8–10.8)

## 2022-05-12 ENCOUNTER — OFFICE VISIT (OUTPATIENT)
Dept: FAMILY MEDICINE | Facility: CLINIC | Age: 72
End: 2022-05-12
Payer: MEDICARE

## 2022-05-12 VITALS
SYSTOLIC BLOOD PRESSURE: 164 MMHG | DIASTOLIC BLOOD PRESSURE: 94 MMHG | HEIGHT: 67 IN | BODY MASS INDEX: 30.71 KG/M2 | WEIGHT: 195.63 LBS | OXYGEN SATURATION: 97 % | HEART RATE: 78 BPM

## 2022-05-12 DIAGNOSIS — M51.36 DDD (DEGENERATIVE DISC DISEASE), LUMBAR: ICD-10-CM

## 2022-05-12 DIAGNOSIS — J45.20 MILD INTERMITTENT ASTHMA WITHOUT COMPLICATION: ICD-10-CM

## 2022-05-12 DIAGNOSIS — I10 PRIMARY HYPERTENSION: Primary | ICD-10-CM

## 2022-05-12 PROCEDURE — 3288F PR FALLS RISK ASSESSMENT DOCUMENTED: ICD-10-PCS | Mod: CPTII,S$GLB,, | Performed by: NURSE PRACTITIONER

## 2022-05-12 PROCEDURE — 3008F PR BODY MASS INDEX (BMI) DOCUMENTED: ICD-10-PCS | Mod: CPTII,S$GLB,, | Performed by: NURSE PRACTITIONER

## 2022-05-12 PROCEDURE — 1101F PT FALLS ASSESS-DOCD LE1/YR: CPT | Mod: CPTII,S$GLB,, | Performed by: NURSE PRACTITIONER

## 2022-05-12 PROCEDURE — 1160F PR REVIEW ALL MEDS BY PRESCRIBER/CLIN PHARMACIST DOCUMENTED: ICD-10-PCS | Mod: CPTII,S$GLB,, | Performed by: NURSE PRACTITIONER

## 2022-05-12 PROCEDURE — 1159F PR MEDICATION LIST DOCUMENTED IN MEDICAL RECORD: ICD-10-PCS | Mod: CPTII,S$GLB,, | Performed by: NURSE PRACTITIONER

## 2022-05-12 PROCEDURE — 1160F RVW MEDS BY RX/DR IN RCRD: CPT | Mod: CPTII,S$GLB,, | Performed by: NURSE PRACTITIONER

## 2022-05-12 PROCEDURE — 3008F BODY MASS INDEX DOCD: CPT | Mod: CPTII,S$GLB,, | Performed by: NURSE PRACTITIONER

## 2022-05-12 PROCEDURE — 3077F SYST BP >= 140 MM HG: CPT | Mod: CPTII,S$GLB,, | Performed by: NURSE PRACTITIONER

## 2022-05-12 PROCEDURE — 3080F DIAST BP >= 90 MM HG: CPT | Mod: CPTII,S$GLB,, | Performed by: NURSE PRACTITIONER

## 2022-05-12 PROCEDURE — 3077F PR MOST RECENT SYSTOLIC BLOOD PRESSURE >= 140 MM HG: ICD-10-PCS | Mod: CPTII,S$GLB,, | Performed by: NURSE PRACTITIONER

## 2022-05-12 PROCEDURE — 1101F PR PT FALLS ASSESS DOC 0-1 FALLS W/OUT INJ PAST YR: ICD-10-PCS | Mod: CPTII,S$GLB,, | Performed by: NURSE PRACTITIONER

## 2022-05-12 PROCEDURE — 3288F FALL RISK ASSESSMENT DOCD: CPT | Mod: CPTII,S$GLB,, | Performed by: NURSE PRACTITIONER

## 2022-05-12 PROCEDURE — 3080F PR MOST RECENT DIASTOLIC BLOOD PRESSURE >= 90 MM HG: ICD-10-PCS | Mod: CPTII,S$GLB,, | Performed by: NURSE PRACTITIONER

## 2022-05-12 PROCEDURE — 1159F MED LIST DOCD IN RCRD: CPT | Mod: CPTII,S$GLB,, | Performed by: NURSE PRACTITIONER

## 2022-05-12 PROCEDURE — 99214 PR OFFICE/OUTPT VISIT, EST, LEVL IV, 30-39 MIN: ICD-10-PCS | Mod: S$GLB,,, | Performed by: NURSE PRACTITIONER

## 2022-05-12 PROCEDURE — 99214 OFFICE O/P EST MOD 30 MIN: CPT | Mod: S$GLB,,, | Performed by: NURSE PRACTITIONER

## 2022-05-12 RX ORDER — ALBUTEROL SULFATE 90 UG/1
2 AEROSOL, METERED RESPIRATORY (INHALATION) EVERY 4 HOURS PRN
Qty: 18 G | Refills: 2 | Status: SHIPPED | OUTPATIENT
Start: 2022-05-12 | End: 2023-05-15 | Stop reason: SDUPTHER

## 2022-05-12 RX ORDER — MELOXICAM 15 MG/1
15 TABLET ORAL DAILY
Qty: 90 TABLET | Refills: 3 | Status: SHIPPED | OUTPATIENT
Start: 2022-05-12 | End: 2023-05-15 | Stop reason: SDUPTHER

## 2022-05-12 RX ORDER — GABAPENTIN 300 MG/1
300 CAPSULE ORAL 3 TIMES DAILY
Qty: 270 CAPSULE | Refills: 3 | Status: SHIPPED | OUTPATIENT
Start: 2022-05-12 | End: 2023-05-15 | Stop reason: SDUPTHER

## 2022-05-12 NOTE — PROGRESS NOTES
SUBJECTIVE:    Patient ID: Tyler Hannah is a 71 y.o. male.    Chief Complaint: Annual Exam (Bottles brought//Pt is here for his annual exam and medication refills//No complaints today.//RIOS)    Pt here for annual visit- DDD/BP.   Pt reports monitors BP at home occas and it runs 150-160s/70-80- reports has been tried on several BP meds in the past and didn't tolerate and refuses any medication.  Patient reports stays active around the house however has not been exercising formally.  Used to ride his bike though has not been doing that lately.    Quit drinking beer last year  Hx of lumbar DDD- stable on gabapentin.  Denies sciatica or leg weakness.  No falls  Reports history of asthma since childhood, uses rescue inhaler about once a month if he gets really short of breath with exertion.  Denies nocturnal cough or shortness of breath      Orders Only on 05/09/2022   Component Date Value Ref Range Status    WBC 05/09/2022 4.9  3.8 - 10.8 Thousand/uL Final    RBC 05/09/2022 5.29  4.20 - 5.80 Million/uL Final    Hemoglobin 05/09/2022 16.0  13.2 - 17.1 g/dL Final    Hematocrit 05/09/2022 48.1  38.5 - 50.0 % Final    MCV 05/09/2022 90.9  80.0 - 100.0 fL Final    MCH 05/09/2022 30.2  27.0 - 33.0 pg Final    MCHC 05/09/2022 33.3  32.0 - 36.0 g/dL Final    RDW 05/09/2022 12.4  11.0 - 15.0 % Final    Platelets 05/09/2022 161  140 - 400 Thousand/uL Final    MPV 05/09/2022 10.2  7.5 - 12.5 fL Final    Neutrophils, Abs 05/09/2022 3,352  1,500 - 7,800 cells/uL Final    Lymph # 05/09/2022 941  850 - 3,900 cells/uL Final    Mono # 05/09/2022 323  200 - 950 cells/uL Final    Eos # 05/09/2022 255  15 - 500 cells/uL Final    Baso # 05/09/2022 29  0 - 200 cells/uL Final    Neutrophils Relative 05/09/2022 68.4  % Final    Lymph % 05/09/2022 19.2  % Final    Mono % 05/09/2022 6.6  % Final    Eosinophil % 05/09/2022 5.2  % Final    Basophil % 05/09/2022 0.6  % Final    Glucose 05/09/2022 99  65 - 99 mg/dL Final     BUN 05/09/2022 17  7 - 25 mg/dL Final    Creatinine 05/09/2022 0.72  0.70 - 1.25 mg/dL Final    eGFR if non African American 05/09/2022 95  > OR = 60 mL/min/1.73m2 Final    eGFR if African American 05/09/2022 110  > OR = 60 mL/min/1.73m2 Final    BUN/Creatinine Ratio 05/09/2022 NOT APPLICABLE  6 - 22 (calc) Final    Sodium 05/09/2022 139  135 - 146 mmol/L Final    Potassium 05/09/2022 4.2  3.5 - 5.3 mmol/L Final    Chloride 05/09/2022 104  98 - 110 mmol/L Final    CO2 05/09/2022 27  20 - 32 mmol/L Final    Calcium 05/09/2022 9.1  8.6 - 10.3 mg/dL Final    Total Protein 05/09/2022 6.9  6.1 - 8.1 g/dL Final    Albumin 05/09/2022 4.5  3.6 - 5.1 g/dL Final    Globulin, Total 05/09/2022 2.4  1.9 - 3.7 g/dL (calc) Final    Albumin/Globulin Ratio 05/09/2022 1.9  1.0 - 2.5 (calc) Final    Total Bilirubin 05/09/2022 0.6  0.2 - 1.2 mg/dL Final    Alkaline Phosphatase 05/09/2022 45  35 - 144 U/L Final    AST 05/09/2022 17  10 - 35 U/L Final    ALT 05/09/2022 11  9 - 46 U/L Final    Cholesterol 05/09/2022 193  <200 mg/dL Final    HDL 05/09/2022 38 (A) > OR = 40 mg/dL Final    Triglycerides 05/09/2022 263 (A) <150 mg/dL Final    LDL Cholesterol 05/09/2022 117 (A) mg/dL (calc) Final    HDL/Cholesterol Ratio 05/09/2022 5.1 (A) <5.0 (calc) Final    Non HDL Chol. (LDL+VLDL) 05/09/2022 155 (A) <130 mg/dL (calc) Final       Past Medical History:   Diagnosis Date    Arthritis     Asthma     Family history of colon cancer in mother 3/5/2020     Past Surgical History:   Procedure Laterality Date    right arm      TRANSFORAMINAL EPIDURAL INJECTION OF STEROID Right 5/21/2019    Procedure: Injection,steroid,epidural,transforaminal approach;  Surgeon: Blade Devine MD;  Location: [facility] beds at [facility];  Service: Pain Management;  Laterality: Right;  C6, C7     Family History   Problem Relation Age of Onset    Cancer Mother     Diabetes Father     Heart disease Father     No Known Problems Sister        The 10-year CVD risk  score (Delmi, et al., 2008) is: 36.7%    Values used to calculate the score:      Age: 71 years      Sex: Male      Diabetic: No      Tobacco smoker: No      Systolic Blood Pressure: 164 mmHg      Is BP treated: No      HDL Cholesterol: 38 mg/dL      Total Cholesterol: 193 mg/dL     Marital Status:   Alcohol History:  reports current alcohol use.  Tobacco History:  reports that he has never smoked. He has never used smokeless tobacco.  Drug History:  has no history on file for drug use.    Health Maintenance Topics with due status: Not Due       Topic Last Completion Date    Colorectal Cancer Screening 03/11/2021    Lipid Panel 05/09/2022     Immunization History   Administered Date(s) Administered    COVID-19, MRNA, LN-S, PF (Pfizer) (Purple Cap) 04/16/2021, 05/07/2021    Influenza 10/09/2014    Influenza (FLUAD) - Trivalent - Adjuvanted - PF (65+) 11/14/2018, 10/23/2019    Influenza - High Dose - PF (65 years and older) 11/27/2017, 10/23/2019    Influenza - Quadrivalent 10/13/2015    Influenza - Quadrivalent - High Dose - PF (65 years and older) 10/12/2020, 10/21/2021    Influenza - Quadrivalent - PF *Preferred* (6 months and older) 11/29/2016    Pneumococcal Conjugate - 13 Valent 06/04/2019    Pneumococcal Polysaccharide - 23 Valent 09/01/2020       Review of patient's allergies indicates:   Allergen Reactions    Statins-hmg-coa reductase inhibitors      Severe muscle pain       Current Outpatient Medications:     fluticasone propionate (FLONASE) 50 mcg/actuation nasal spray, 1 spray by Each Nostril route once daily., Disp: , Rfl:     timoloL (BETIMOL) 0.5 % ophthalmic solution, Place 1 drop into both eyes 2 (two) times daily., Disp: , Rfl:     triamcinolone acetonide 0.1% (KENALOG) 0.1 % cream, Apply topically as needed (eczema)., Disp: 454 g, Rfl: 1    albuterol (VENTOLIN HFA) 90 mcg/actuation inhaler, Inhale 2 puffs into the lungs every 4 (four) hours as needed for Wheezing. Rescue,  "Disp: 18 g, Rfl: 2    gabapentin (NEURONTIN) 300 MG capsule, Take 1 capsule (300 mg total) by mouth 3 (three) times daily., Disp: 270 capsule, Rfl: 3    meloxicam (MOBIC) 15 MG tablet, Take 1 tablet (15 mg total) by mouth once daily., Disp: 90 tablet, Rfl: 3    Review of Systems   Constitutional: Negative for appetite change, chills, fever and unexpected weight change.   HENT: Negative for sore throat and trouble swallowing.    Eyes: Negative for visual disturbance.   Respiratory: Positive for shortness of breath (occas with exertion, uses albuterol about once a month with relief). Negative for cough and wheezing.    Cardiovascular: Negative for chest pain, palpitations and leg swelling.   Gastrointestinal: Negative for abdominal pain, constipation and diarrhea.   Genitourinary: Negative for dysuria, frequency and hematuria.   Musculoskeletal: Positive for arthralgias (bilat hands) and back pain (across lower back, no longer has sciatica pain). Negative for gait problem and neck pain.   Skin: Negative for rash.   Neurological: Negative for dizziness, syncope, speech difficulty, numbness and headaches.   Psychiatric/Behavioral: Negative for dysphoric mood. The patient is not nervous/anxious.           Objective:      Vitals:    05/12/22 0837 05/12/22 0843 05/12/22 0900   BP: (!) 168/90 (!) 172/88 (!) 164/94   Pulse: 78     SpO2: 97%     Weight: 88.7 kg (195 lb 9.6 oz)     Height: 5' 6.5" (1.689 m)       Physical Exam  Vitals and nursing note reviewed.   Constitutional:       General: He is not in acute distress.     Appearance: Normal appearance. He is well-developed and normal weight.   HENT:      Head: Normocephalic and atraumatic.      Right Ear: Tympanic membrane and ear canal normal.      Left Ear: Tympanic membrane and ear canal normal.   Neck:      Vascular: No carotid bruit.   Cardiovascular:      Rate and Rhythm: Normal rate and regular rhythm.      Heart sounds: No murmur heard.    No friction rub. No " gallop.   Pulmonary:      Effort: Pulmonary effort is normal. No respiratory distress.      Breath sounds: No wheezing or rales.   Abdominal:      General: There is no distension.      Palpations: Abdomen is soft.      Tenderness: There is no abdominal tenderness.   Musculoskeletal:      Cervical back: Neck supple.      Right lower leg: No edema.      Left lower leg: No edema.   Lymphadenopathy:      Cervical: No cervical adenopathy.   Skin:     General: Skin is warm and dry.      Findings: No rash.   Neurological:      General: No focal deficit present.      Mental Status: He is alert and oriented to person, place, and time.      Gait: Gait normal.           Assessment:       1. Primary hypertension    2. DDD (degenerative disc disease), lumbar    3. Mild intermittent asthma without complication           Plan:       Primary hypertension  Comments:  BP uncontrolled, cautioned on risks of CVA, MI and renal failure- recommended starting mediction but he declines. stressed diet/exercise and monitor BP at home.  Recheck BP in 2 weeks and advised I recommend follow-up appointment in 2-3 months however patient states he only wants to come for an annual visit    DDD (degenerative disc disease), lumbar  Comments:  Well controlled on gabapentin/NSAID  Orders:  -     gabapentin (NEURONTIN) 300 MG capsule; Take 1 capsule (300 mg total) by mouth 3 (three) times daily.  Dispense: 270 capsule; Refill: 3  -     meloxicam (MOBIC) 15 MG tablet; Take 1 tablet (15 mg total) by mouth once daily.  Dispense: 90 tablet; Refill: 3    Mild intermittent asthma without complication  Comments:  Well controlled  Orders:  -     albuterol (VENTOLIN HFA) 90 mcg/actuation inhaler; Inhale 2 puffs into the lungs every 4 (four) hours as needed for Wheezing. Rescue  Dispense: 18 g; Refill: 2      Follow up in about 1 year (around 5/12/2023) for nurse visit in 2 weeks for BP check.          Counseled on age and gender appropriate medical preventative  services, including cancer screenings, immunizations, overall nutritional health, need for a consistent exercise regimen and an overall push towards maintaining a vigorous and active lifestyle.      5/12/2022 Zuleima Caballero NP

## 2022-05-15 NOTE — PROGRESS NOTES
Call patient.  CBC shows no anemia, sugar kidneys and liver look normal.  Cholesterol good at 193, however triglycerides are up to 263. Recommend reducing fried foods and fast food in the diet.  Add fish oil or Krill oil twice a day to help lower triglycerides.  Repeat lipid panel in 6 months.

## 2022-05-16 ENCOUNTER — TELEPHONE (OUTPATIENT)
Dept: FAMILY MEDICINE | Facility: CLINIC | Age: 72
End: 2022-05-16

## 2022-05-16 NOTE — TELEPHONE ENCOUNTER
Spoke to patient regarding lab results per Dr. Montano and the patient verbalized understanding. Krill oil was added to the medication list.

## 2022-05-16 NOTE — TELEPHONE ENCOUNTER
----- Message from Sudhir Montano MD sent at 5/15/2022  6:11 PM CDT -----  Call patient.  CBC shows no anemia, sugar kidneys and liver look normal.  Cholesterol good at 193, however triglycerides are up to 263. Recommend reducing fried foods and fast food in the diet.  Add fish oil or Krill oil twice a day to help lower triglycerides.  Repeat lipid panel in 6 months.

## 2023-04-26 ENCOUNTER — TELEPHONE (OUTPATIENT)
Dept: FAMILY MEDICINE | Facility: CLINIC | Age: 73
End: 2023-04-26

## 2023-04-26 DIAGNOSIS — Z12.5 SPECIAL SCREENING FOR MALIGNANT NEOPLASM OF PROSTATE: ICD-10-CM

## 2023-04-26 DIAGNOSIS — I10 PRIMARY HYPERTENSION: ICD-10-CM

## 2023-04-26 DIAGNOSIS — Z00.00 ROUTINE MEDICAL EXAM: Primary | ICD-10-CM

## 2023-04-26 DIAGNOSIS — E78.5 DYSLIPIDEMIA: ICD-10-CM

## 2023-04-26 DIAGNOSIS — Z79.899 ENCOUNTER FOR LONG-TERM (CURRENT) USE OF OTHER MEDICATIONS: ICD-10-CM

## 2023-04-26 NOTE — TELEPHONE ENCOUNTER
Spoke with pt in regards to pre-visit labs. Verbalized that we have placed lab order for you to have done before your upcoming appointment on 05/15/2023. Verbalized that the lab order are placed through [facility], so they can come into the office anytime, no appointment necessary. Just make sure that you are fasting for you labs. Pt acknowledge understanding

## 2023-05-11 LAB
ALBUMIN SERPL-MCNC: 4.7 G/DL (ref 3.6–5.1)
ALBUMIN/GLOB SERPL: 1.6 (CALC) (ref 1–2.5)
ALP SERPL-CCNC: 48 U/L (ref 35–144)
ALT SERPL-CCNC: 8 U/L (ref 9–46)
APPEARANCE UR: CLEAR
AST SERPL-CCNC: 16 U/L (ref 10–35)
BACTERIA #/AREA URNS HPF: NORMAL /HPF
BACTERIA UR CULT: NORMAL
BASOPHILS # BLD AUTO: 30 CELLS/UL (ref 0–200)
BASOPHILS NFR BLD AUTO: 0.6 %
BILIRUB SERPL-MCNC: 0.7 MG/DL (ref 0.2–1.2)
BILIRUB UR QL STRIP: NEGATIVE
BUN SERPL-MCNC: 20 MG/DL (ref 7–25)
BUN/CREAT SERPL: ABNORMAL (CALC) (ref 6–22)
CALCIUM SERPL-MCNC: 9.6 MG/DL (ref 8.6–10.3)
CHLORIDE SERPL-SCNC: 102 MMOL/L (ref 98–110)
CHOLEST SERPL-MCNC: 232 MG/DL
CHOLEST/HDLC SERPL: 4.8 (CALC)
CO2 SERPL-SCNC: 28 MMOL/L (ref 20–32)
COLOR UR: YELLOW
CREAT SERPL-MCNC: 0.77 MG/DL (ref 0.7–1.28)
EGFR: 96 ML/MIN/1.73M2
EOSINOPHIL # BLD AUTO: 290 CELLS/UL (ref 15–500)
EOSINOPHIL NFR BLD AUTO: 5.8 %
ERYTHROCYTE [DISTWIDTH] IN BLOOD BY AUTOMATED COUNT: 13.8 % (ref 11–15)
GLOBULIN SER CALC-MCNC: 2.9 G/DL (CALC) (ref 1.9–3.7)
GLUCOSE SERPL-MCNC: 100 MG/DL (ref 65–99)
GLUCOSE UR QL STRIP: NEGATIVE
HCT VFR BLD AUTO: 47.2 % (ref 38.5–50)
HDLC SERPL-MCNC: 48 MG/DL
HGB BLD-MCNC: 17 G/DL (ref 13.2–17.1)
HGB UR QL STRIP: NEGATIVE
HYALINE CASTS #/AREA URNS LPF: NORMAL /LPF
KETONES UR QL STRIP: NEGATIVE
LDLC SERPL CALC-MCNC: 145 MG/DL (CALC)
LEUKOCYTE ESTERASE UR QL STRIP: NEGATIVE
LYMPHOCYTES # BLD AUTO: 1110 CELLS/UL (ref 850–3900)
LYMPHOCYTES NFR BLD AUTO: 22.2 %
MCH RBC QN AUTO: 33.7 PG (ref 27–33)
MCHC RBC AUTO-ENTMCNC: 36 G/DL (ref 32–36)
MCV RBC AUTO: 93.5 FL (ref 80–100)
MONOCYTES # BLD AUTO: 325 CELLS/UL (ref 200–950)
MONOCYTES NFR BLD AUTO: 6.5 %
NEUTROPHILS # BLD AUTO: 3245 CELLS/UL (ref 1500–7800)
NEUTROPHILS NFR BLD AUTO: 64.9 %
NITRITE UR QL STRIP: NEGATIVE
NONHDLC SERPL-MCNC: 184 MG/DL (CALC)
PH UR STRIP: 7 [PH] (ref 5–8)
PLATELET # BLD AUTO: 160 THOUSAND/UL (ref 140–400)
PMV BLD REES-ECKER: 11.5 FL (ref 7.5–12.5)
POTASSIUM SERPL-SCNC: 4.2 MMOL/L (ref 3.5–5.3)
PROT SERPL-MCNC: 7.6 G/DL (ref 6.1–8.1)
PROT UR QL STRIP: NEGATIVE
PSA SERPL-MCNC: 1.89 NG/ML
RBC # BLD AUTO: 5.05 MILLION/UL (ref 4.2–5.8)
RBC #/AREA URNS HPF: NORMAL /HPF
SERVICE CMNT-IMP: NORMAL
SODIUM SERPL-SCNC: 139 MMOL/L (ref 135–146)
SP GR UR STRIP: 1.02 (ref 1–1.03)
SQUAMOUS #/AREA URNS HPF: NORMAL /HPF
TRIGL SERPL-MCNC: 252 MG/DL
TSH SERPL-ACNC: 1.32 MIU/L (ref 0.4–4.5)
WBC # BLD AUTO: 5 THOUSAND/UL (ref 3.8–10.8)
WBC #/AREA URNS HPF: NORMAL /HPF

## 2023-05-15 ENCOUNTER — OFFICE VISIT (OUTPATIENT)
Dept: FAMILY MEDICINE | Facility: CLINIC | Age: 73
End: 2023-05-15
Payer: MEDICARE

## 2023-05-15 ENCOUNTER — TELEPHONE (OUTPATIENT)
Dept: FAMILY MEDICINE | Facility: CLINIC | Age: 73
End: 2023-05-15

## 2023-05-15 VITALS
DIASTOLIC BLOOD PRESSURE: 88 MMHG | HEART RATE: 75 BPM | OXYGEN SATURATION: 99 % | BODY MASS INDEX: 30.69 KG/M2 | WEIGHT: 195.5 LBS | HEIGHT: 67 IN | SYSTOLIC BLOOD PRESSURE: 136 MMHG

## 2023-05-15 DIAGNOSIS — E78.00 ELEVATED LDL CHOLESTEROL LEVEL: ICD-10-CM

## 2023-05-15 DIAGNOSIS — J45.20 MILD INTERMITTENT ASTHMA WITHOUT COMPLICATION: ICD-10-CM

## 2023-05-15 DIAGNOSIS — T46.6X5A MYALGIA DUE TO STATIN: ICD-10-CM

## 2023-05-15 DIAGNOSIS — Z80.0 FAMILY HISTORY OF COLON CANCER IN MOTHER: ICD-10-CM

## 2023-05-15 DIAGNOSIS — M79.10 MYALGIA DUE TO STATIN: ICD-10-CM

## 2023-05-15 DIAGNOSIS — M51.36 DDD (DEGENERATIVE DISC DISEASE), LUMBAR: Primary | ICD-10-CM

## 2023-05-15 DIAGNOSIS — G47.33 OSA (OBSTRUCTIVE SLEEP APNEA): ICD-10-CM

## 2023-05-15 PROCEDURE — 1101F PT FALLS ASSESS-DOCD LE1/YR: CPT | Mod: CPTII,S$GLB,, | Performed by: NURSE PRACTITIONER

## 2023-05-15 PROCEDURE — 3075F PR MOST RECENT SYSTOLIC BLOOD PRESS GE 130-139MM HG: ICD-10-PCS | Mod: CPTII,S$GLB,, | Performed by: NURSE PRACTITIONER

## 2023-05-15 PROCEDURE — 99214 OFFICE O/P EST MOD 30 MIN: CPT | Mod: S$GLB,,, | Performed by: NURSE PRACTITIONER

## 2023-05-15 PROCEDURE — 3008F BODY MASS INDEX DOCD: CPT | Mod: CPTII,S$GLB,, | Performed by: NURSE PRACTITIONER

## 2023-05-15 PROCEDURE — 1160F PR REVIEW ALL MEDS BY PRESCRIBER/CLIN PHARMACIST DOCUMENTED: ICD-10-PCS | Mod: CPTII,S$GLB,, | Performed by: NURSE PRACTITIONER

## 2023-05-15 PROCEDURE — 3008F PR BODY MASS INDEX (BMI) DOCUMENTED: ICD-10-PCS | Mod: CPTII,S$GLB,, | Performed by: NURSE PRACTITIONER

## 2023-05-15 PROCEDURE — 1101F PR PT FALLS ASSESS DOC 0-1 FALLS W/OUT INJ PAST YR: ICD-10-PCS | Mod: CPTII,S$GLB,, | Performed by: NURSE PRACTITIONER

## 2023-05-15 PROCEDURE — 3075F SYST BP GE 130 - 139MM HG: CPT | Mod: CPTII,S$GLB,, | Performed by: NURSE PRACTITIONER

## 2023-05-15 PROCEDURE — 3079F PR MOST RECENT DIASTOLIC BLOOD PRESSURE 80-89 MM HG: ICD-10-PCS | Mod: CPTII,S$GLB,, | Performed by: NURSE PRACTITIONER

## 2023-05-15 PROCEDURE — 1159F PR MEDICATION LIST DOCUMENTED IN MEDICAL RECORD: ICD-10-PCS | Mod: CPTII,S$GLB,, | Performed by: NURSE PRACTITIONER

## 2023-05-15 PROCEDURE — 1160F RVW MEDS BY RX/DR IN RCRD: CPT | Mod: CPTII,S$GLB,, | Performed by: NURSE PRACTITIONER

## 2023-05-15 PROCEDURE — 3079F DIAST BP 80-89 MM HG: CPT | Mod: CPTII,S$GLB,, | Performed by: NURSE PRACTITIONER

## 2023-05-15 PROCEDURE — 1159F MED LIST DOCD IN RCRD: CPT | Mod: CPTII,S$GLB,, | Performed by: NURSE PRACTITIONER

## 2023-05-15 PROCEDURE — 3288F FALL RISK ASSESSMENT DOCD: CPT | Mod: CPTII,S$GLB,, | Performed by: NURSE PRACTITIONER

## 2023-05-15 PROCEDURE — 99214 PR OFFICE/OUTPT VISIT, EST, LEVL IV, 30-39 MIN: ICD-10-PCS | Mod: S$GLB,,, | Performed by: NURSE PRACTITIONER

## 2023-05-15 PROCEDURE — 3288F PR FALLS RISK ASSESSMENT DOCUMENTED: ICD-10-PCS | Mod: CPTII,S$GLB,, | Performed by: NURSE PRACTITIONER

## 2023-05-15 RX ORDER — MELOXICAM 15 MG/1
15 TABLET ORAL DAILY
Qty: 90 TABLET | Refills: 3 | Status: SHIPPED | OUTPATIENT
Start: 2023-05-15

## 2023-05-15 RX ORDER — ALBUTEROL SULFATE 90 UG/1
2 AEROSOL, METERED RESPIRATORY (INHALATION) EVERY 4 HOURS PRN
Qty: 18 G | Refills: 2 | Status: SHIPPED | OUTPATIENT
Start: 2023-05-15

## 2023-05-15 RX ORDER — GABAPENTIN 300 MG/1
300 CAPSULE ORAL 3 TIMES DAILY
Qty: 270 CAPSULE | Refills: 3 | Status: SHIPPED | OUTPATIENT
Start: 2023-05-15

## 2023-05-15 NOTE — TELEPHONE ENCOUNTER
Spoke to rachna at [facility] and gave the Verbal to changed medication to generic inhaler because Ventolin is not covered by insurance

## 2023-05-15 NOTE — PROGRESS NOTES
SUBJECTIVE:    Patient ID: Tyler Hannah is a 72 y.o. male.    Chief Complaint: Annual Exam (No bottles//Pt is here for his annual exam and medication refills//RIOS )    Pt here for annual visit- DDD/BP.     Pt reports overall he's doing okay. No c/o's today    Admits hasn't been exercising    Mother had hx of colon CA-  at age 42- pt's last cscope was approx 8 years ago and told to repeat in 3 years but he declined. Had cologuard in  which was negative    Hx of lumbar DDD- stable on gabapentin.  Denies sciatica or leg weakness.  No falls    Reports history of asthma since childhood, uses rescue inhaler about once a month if he gets really short of breath with exertion.  Denies nocturnal cough or shortness of breath      Telephone on 2023   Component Date Value Ref Range Status    WBC 05/10/2023 5.0  3.8 - 10.8 Thousand/uL Final    RBC 05/10/2023 5.05  4.20 - 5.80 Million/uL Final    Hemoglobin 05/10/2023 17.0  13.2 - 17.1 g/dL Final    Hematocrit 05/10/2023 47.2  38.5 - 50.0 % Final    MCV 05/10/2023 93.5  80.0 - 100.0 fL Final    MCH 05/10/2023 33.7 (H)  27.0 - 33.0 pg Final    MCHC 05/10/2023 36.0  32.0 - 36.0 g/dL Final    RDW 05/10/2023 13.8  11.0 - 15.0 % Final    Platelets 05/10/2023 160  140 - 400 Thousand/uL Final    MPV 05/10/2023 11.5  7.5 - 12.5 fL Final    Neutrophils, Abs 05/10/2023 3,245  1,500 - 7,800 cells/uL Final    Lymph # 05/10/2023 1,110  850 - 3,900 cells/uL Final    Mono # 05/10/2023 325  200 - 950 cells/uL Final    Eos # 05/10/2023 290  15 - 500 cells/uL Final    Baso # 05/10/2023 30  0 - 200 cells/uL Final    Neutrophils Relative 05/10/2023 64.9  % Final    Lymph % 05/10/2023 22.2  % Final    Mono % 05/10/2023 6.5  % Final    Eosinophil % 05/10/2023 5.8  % Final    Basophil % 05/10/2023 0.6  % Final    Glucose 05/10/2023 100 (H)  65 - 99 mg/dL Final    BUN 05/10/2023 20  7 - 25 mg/dL Final    Creatinine 05/10/2023 0.77  0.70 - 1.28 mg/dL Final    eGFR 05/10/2023 96  > OR = 60  mL/min/1.73m2 Final    BUN/Creatinine Ratio 05/10/2023 NOT APPLICABLE  6 - 22 (calc) Final    Sodium 05/10/2023 139  135 - 146 mmol/L Final    Potassium 05/10/2023 4.2  3.5 - 5.3 mmol/L Final    Chloride 05/10/2023 102  98 - 110 mmol/L Final    CO2 05/10/2023 28  20 - 32 mmol/L Final    Calcium 05/10/2023 9.6  8.6 - 10.3 mg/dL Final    Total Protein 05/10/2023 7.6  6.1 - 8.1 g/dL Final    Albumin 05/10/2023 4.7  3.6 - 5.1 g/dL Final    Globulin, Total 05/10/2023 2.9  1.9 - 3.7 g/dL (calc) Final    Albumin/Globulin Ratio 05/10/2023 1.6  1.0 - 2.5 (calc) Final    Total Bilirubin 05/10/2023 0.7  0.2 - 1.2 mg/dL Final    Alkaline Phosphatase 05/10/2023 48  35 - 144 U/L Final    AST 05/10/2023 16  10 - 35 U/L Final    ALT 05/10/2023 8 (L)  9 - 46 U/L Final    Cholesterol 05/10/2023 232 (H)  <200 mg/dL Final    HDL 05/10/2023 48  > OR = 40 mg/dL Final    Triglycerides 05/10/2023 252 (H)  <150 mg/dL Final    LDL Cholesterol 05/10/2023 145 (H)  mg/dL (calc) Final    HDL/Cholesterol Ratio 05/10/2023 4.8  <5.0 (calc) Final    Non HDL Chol. (LDL+VLDL) 05/10/2023 184 (H)  <130 mg/dL (calc) Final    Color, UA 05/10/2023 YELLOW  YELLOW Final    Appearance, UA 05/10/2023 CLEAR  CLEAR Final    Specific Gravity, UA 05/10/2023 1.022  1.001 - 1.035 Final    pH, UA 05/10/2023 7.0  5.0 - 8.0 Final    Glucose, UA 05/10/2023 NEGATIVE  NEGATIVE Final    Bilirubin, UA 05/10/2023 NEGATIVE  NEGATIVE Final    Ketones, UA 05/10/2023 NEGATIVE  NEGATIVE Final    Occult Blood UA 05/10/2023 NEGATIVE  NEGATIVE Final    Protein, UA 05/10/2023 NEGATIVE  NEGATIVE Final    Nitrite, UA 05/10/2023 NEGATIVE  NEGATIVE Final    Leukocytes, UA 05/10/2023 NEGATIVE  NEGATIVE Final    WBC Casts, UA 05/10/2023 NONE SEEN  < OR = 5 /HPF Final    RBC Casts, UA 05/10/2023 NONE SEEN  < OR = 2 /HPF Final    Squam Epithel, UA 05/10/2023 NONE SEEN  < OR = 5 /HPF Final    Bacteria, UA 05/10/2023 NONE SEEN  NONE SEEN /HPF Final    Hyaline Casts, UA 05/10/2023 NONE SEEN   NONE SEEN /LPF Final    Service Cmt: 05/10/2023    Final    Reflexive Urine Culture 05/10/2023    Final    TSH w/reflex to FT4 05/10/2023 1.32  0.40 - 4.50 mIU/L Final    PROSTATE SPECIFIC ANTIGEN, SCR - Q* 05/10/2023 1.89  < OR = 4.00 ng/mL Final       Past Medical History:   Diagnosis Date    Arthritis     Asthma     Family history of colon cancer in mother 3/5/2020     Past Surgical History:   Procedure Laterality Date    right arm      TRANSFORAMINAL EPIDURAL INJECTION OF STEROID Right 5/21/2019    Procedure: Injection,steroid,epidural,transforaminal approach;  Surgeon: Blade Devine MD;  Location: [facility] OR;  Service: Pain Management;  Laterality: Right;  C6, C7     Family History   Problem Relation Age of Onset    Cancer Mother 41        colon CA    Diabetes Father     Heart disease Father     No Known Problems Sister        The 10-year CVD risk score (ELINOR'Agostino et al., 2008) is: 36.5%    Values used to calculate the score:      Age: 72 years      Sex: Male      Diabetic: No      Tobacco smoker: No      Systolic Blood Pressure: 136 mmHg      Is BP treated: Yes      HDL Cholesterol: 48 mg/dL      Total Cholesterol: 232 mg/dL     Marital Status:   Alcohol History:  reports current alcohol use.  Tobacco History:  reports that he has never smoked. He has never been exposed to tobacco smoke. He has never used smokeless tobacco.  Drug History:  has no history on file for drug use.    Health Maintenance Topics with due status: Not Due       Topic Last Completion Date    Colorectal Cancer Screening 03/11/2021    Lipid Panel 05/10/2023     Immunization History   Administered Date(s) Administered    COVID-19, MRNA, LN-S, PF (Pfizer) (Purple Cap) 04/16/2021, 05/07/2021    Influenza 10/09/2014    Influenza (FLUAD) - Quadrivalent - Adjuvanted - PF *Preferred* (65+) 10/24/2022    Influenza (FLUAD) - Trivalent - Adjuvanted - PF (65+) 11/14/2018, 10/23/2019    Influenza - High Dose - PF (65 years and older) 11/27/2017,  10/23/2019    Influenza - Quadrivalent 10/13/2015    Influenza - Quadrivalent - High Dose - PF (65 years and older) 10/12/2020, 10/21/2021    Influenza - Quadrivalent - PF *Preferred* (6 months and older) 11/29/2016    Pneumococcal Conjugate - 13 Valent 06/04/2019    Pneumococcal Polysaccharide - 23 Valent 09/01/2020       Review of patient's allergies indicates:   Allergen Reactions    Statins-hmg-coa reductase inhibitors      Severe muscle pain       Current Outpatient Medications:     timoloL (BETIMOL) 0.5 % ophthalmic solution, Place 1 drop into both eyes 2 (two) times daily., Disp: , Rfl:     albuterol (VENTOLIN HFA) 90 mcg/actuation inhaler, Inhale 2 puffs into the lungs every 4 (four) hours as needed for Wheezing. Rescue, Disp: 18 g, Rfl: 2    fluticasone propionate (FLONASE) 50 mcg/actuation nasal spray, 1 spray by Each Nostril route once daily., Disp: , Rfl:     gabapentin (NEURONTIN) 300 MG capsule, Take 1 capsule (300 mg total) by mouth 3 (three) times daily., Disp: 270 capsule, Rfl: 3    KRILL OIL ORAL, Take by mouth 2 (two) times a day., Disp: , Rfl:     meloxicam (MOBIC) 15 MG tablet, Take 1 tablet (15 mg total) by mouth once daily., Disp: 90 tablet, Rfl: 3    triamcinolone acetonide 0.1% (KENALOG) 0.1 % cream, Apply topically as needed (eczema). (Patient not taking: Reported on 5/15/2023), Disp: 454 g, Rfl: 1    Review of Systems   Constitutional:  Negative for appetite change, chills, fever and unexpected weight change.   HENT:  Negative for sore throat and trouble swallowing.    Eyes:  Negative for visual disturbance.   Respiratory:  Positive for shortness of breath (occas with exertion, uses albuterol about once a month with relief). Negative for cough and wheezing.    Cardiovascular:  Negative for chest pain, palpitations and leg swelling.   Gastrointestinal:  Negative for abdominal pain, constipation and diarrhea.   Genitourinary:  Negative for dysuria, frequency and hematuria.   Musculoskeletal:  " Positive for arthralgias (bilat hands) and back pain (across lower back, stable). Negative for gait problem and neck pain.   Skin:  Negative for rash.   Neurological:  Negative for dizziness, syncope, speech difficulty, numbness and headaches.   Psychiatric/Behavioral:  Negative for dysphoric mood. The patient is not nervous/anxious.         Objective:      Vitals:    05/15/23 0839   BP: 136/88   Pulse: 75   SpO2: 99%   Weight: 88.7 kg (195 lb 8 oz)   Height: 5' 6.5" (1.689 m)     Physical Exam  Vitals and nursing note reviewed.   Constitutional:       General: He is not in acute distress.     Appearance: Normal appearance. He is well-developed and normal weight.   HENT:      Head: Normocephalic and atraumatic.      Right Ear: Tympanic membrane and ear canal normal.      Left Ear: Tympanic membrane and ear canal normal.   Neck:      Vascular: No carotid bruit.   Cardiovascular:      Rate and Rhythm: Normal rate and regular rhythm.      Heart sounds: No murmur heard.  Pulmonary:      Effort: Pulmonary effort is normal. No respiratory distress.      Breath sounds: Normal breath sounds. No wheezing or rales.   Abdominal:      General: There is no distension.      Palpations: Abdomen is soft.      Tenderness: There is no abdominal tenderness.   Musculoskeletal:      Cervical back: Neck supple.      Right lower leg: No edema.      Left lower leg: No edema.   Lymphadenopathy:      Cervical: No cervical adenopathy.   Skin:     General: Skin is warm and dry.      Findings: No rash.   Neurological:      General: No focal deficit present.      Mental Status: He is alert and oriented to person, place, and time.      Gait: Gait normal.         Assessment:       1. DDD (degenerative disc disease), lumbar    2. Elevated LDL cholesterol level    3. RAULITO (obstructive sleep apnea)    4. Mild intermittent asthma without complication    5. Myalgia due to statin    6. Family history of colon cancer in mother           Plan:       1. " DDD (degenerative disc disease), lumbar  -stable on gabapentin and meloxicam  -     gabapentin (NEURONTIN) 300 MG capsule; Take 1 capsule (300 mg total) by mouth 3 (three) times daily.  Dispense: 270 capsule; Refill: 3  -     meloxicam (MOBIC) 15 MG tablet; Take 1 tablet (15 mg total) by mouth once daily.  Dispense: 90 tablet; Refill: 3    2. Elevated LDL cholesterol level   -reviewed recent labs with patient, elevated LDL to 145, .  Patient reports history of statin myalgias and declines any other medication for cholesterol.    3. RAULITO (obstructive sleep apnea)  -stable wearing CPAP nightly, needs new supplies.  Request order to bring to new [employer] company  -     [employer] - OTHER    4. Mild intermittent asthma without complication  -stable  -     albuterol (VENTOLIN HFA) 90 mcg/actuation inhaler; Inhale 2 puffs into the lungs every 4 (four) hours as needed for Wheezing. Rescue  Dispense: 18 g; Refill: 2    5. Myalgia due to statin    6. Family history of colon cancer in mother   -patient advised given mother's history would recommend repeat colonoscopy as Cologuard should not be used as screening tool and patient with family history of colon cancer.  He declines further colonoscopy however    Follow up in about 1 year (around 5/15/2024) for annual visit.          Counseled on age and gender appropriate medical preventative services, including cancer screenings, immunizations, overall nutritional health, need for a consistent exercise regimen and an overall push towards maintaining a vigorous and active lifestyle.      5/15/2023 Zuleima Caballero NP

## 2023-05-15 NOTE — TELEPHONE ENCOUNTER
----- Message from Cristy Geiger sent at 5/15/2023  9:44 AM CDT -----   05/15/23 @ 9:43 AM :[facility] pharmacy called and stated that they have a prescription for the patient inhaler and the one that was requested is not covered by the patient insurance they need to have the office call them back at [phone] (no name of staff was left on the message)

## 2023-12-13 ENCOUNTER — OFFICE VISIT (OUTPATIENT)
Dept: DERMATOLOGY | Facility: CLINIC | Age: 73
End: 2023-12-13
Payer: MEDICARE

## 2023-12-13 VITALS — BODY MASS INDEX: 31.34 KG/M2 | WEIGHT: 195 LBS | HEIGHT: 66 IN

## 2023-12-13 DIAGNOSIS — L57.0 ACTINIC KERATOSES: ICD-10-CM

## 2023-12-13 DIAGNOSIS — L98.9 DISEASE OF SKIN AND SUBCUTANEOUS TISSUE: Primary | ICD-10-CM

## 2023-12-13 DIAGNOSIS — L82.1 SEBORRHEIC KERATOSES: ICD-10-CM

## 2023-12-13 PROCEDURE — 3288F FALL RISK ASSESSMENT DOCD: CPT | Mod: CPTII,S$GLB,, | Performed by: DERMATOLOGY

## 2023-12-13 PROCEDURE — 17000 DESTRUCT PREMALG LESION: CPT | Mod: XS,S$GLB,, | Performed by: DERMATOLOGY

## 2023-12-13 PROCEDURE — 1160F RVW MEDS BY RX/DR IN RCRD: CPT | Mod: CPTII,S$GLB,, | Performed by: DERMATOLOGY

## 2023-12-13 PROCEDURE — 88305 TISSUE EXAM BY PATHOLOGIST: ICD-10-PCS | Mod: 26,,, | Performed by: PATHOLOGY

## 2023-12-13 PROCEDURE — 11104 PUNCH BX SKIN SINGLE LESION: CPT | Mod: S$GLB,,, | Performed by: DERMATOLOGY

## 2023-12-13 PROCEDURE — 17003 DESTRUCT PREMALG LES 2-14: CPT | Mod: S$GLB,,, | Performed by: DERMATOLOGY

## 2023-12-13 PROCEDURE — 3008F PR BODY MASS INDEX (BMI) DOCUMENTED: ICD-10-PCS | Mod: CPTII,S$GLB,, | Performed by: DERMATOLOGY

## 2023-12-13 PROCEDURE — 17000 PR DESTRUCTION(LASER SURGERY,CRYOSURGERY,CHEMOSURGERY),PREMALIGNANT LESIONS,FIRST LESION: ICD-10-PCS | Mod: XS,S$GLB,, | Performed by: DERMATOLOGY

## 2023-12-13 PROCEDURE — 88312 SPECIAL STAINS GROUP 1: CPT | Mod: 26,,, | Performed by: PATHOLOGY

## 2023-12-13 PROCEDURE — 1101F PT FALLS ASSESS-DOCD LE1/YR: CPT | Mod: CPTII,S$GLB,, | Performed by: DERMATOLOGY

## 2023-12-13 PROCEDURE — 99204 PR OFFICE/OUTPT VISIT, NEW, LEVL IV, 45-59 MIN: ICD-10-PCS | Mod: 25,S$GLB,, | Performed by: DERMATOLOGY

## 2023-12-13 PROCEDURE — 99204 OFFICE O/P NEW MOD 45 MIN: CPT | Mod: 25,S$GLB,, | Performed by: DERMATOLOGY

## 2023-12-13 PROCEDURE — 88305 TISSUE EXAM BY PATHOLOGIST: CPT | Performed by: PATHOLOGY

## 2023-12-13 PROCEDURE — 1160F PR REVIEW ALL MEDS BY PRESCRIBER/CLIN PHARMACIST DOCUMENTED: ICD-10-PCS | Mod: CPTII,S$GLB,, | Performed by: DERMATOLOGY

## 2023-12-13 PROCEDURE — 3008F BODY MASS INDEX DOCD: CPT | Mod: CPTII,S$GLB,, | Performed by: DERMATOLOGY

## 2023-12-13 PROCEDURE — 1101F PR PT FALLS ASSESS DOC 0-1 FALLS W/OUT INJ PAST YR: ICD-10-PCS | Mod: CPTII,S$GLB,, | Performed by: DERMATOLOGY

## 2023-12-13 PROCEDURE — 88312 PR  SPECIAL STAINS,GROUP I: ICD-10-PCS | Mod: 26,,, | Performed by: PATHOLOGY

## 2023-12-13 PROCEDURE — 11104 PR PUNCH BIOPSY, SKIN, SINGLE LESION: ICD-10-PCS | Mod: S$GLB,,, | Performed by: DERMATOLOGY

## 2023-12-13 PROCEDURE — 3288F PR FALLS RISK ASSESSMENT DOCUMENTED: ICD-10-PCS | Mod: CPTII,S$GLB,, | Performed by: DERMATOLOGY

## 2023-12-13 PROCEDURE — 17003 DESTRUCTION, PREMALIGNANT LESIONS; SECOND THROUGH 14 LESIONS: ICD-10-PCS | Mod: S$GLB,,, | Performed by: DERMATOLOGY

## 2023-12-13 PROCEDURE — 88312 SPECIAL STAINS GROUP 1: CPT | Performed by: PATHOLOGY

## 2023-12-13 PROCEDURE — 1159F MED LIST DOCD IN RCRD: CPT | Mod: CPTII,S$GLB,, | Performed by: DERMATOLOGY

## 2023-12-13 PROCEDURE — 1159F PR MEDICATION LIST DOCUMENTED IN MEDICAL RECORD: ICD-10-PCS | Mod: CPTII,S$GLB,, | Performed by: DERMATOLOGY

## 2023-12-13 PROCEDURE — 88305 TISSUE EXAM BY PATHOLOGIST: CPT | Mod: 26,,, | Performed by: PATHOLOGY

## 2023-12-13 RX ORDER — BETAMETHASONE DIPROPIONATE 0.5 MG/G
CREAM TOPICAL
Qty: 45 G | Refills: 3 | Status: SHIPPED | OUTPATIENT
Start: 2023-12-13

## 2023-12-13 RX ORDER — DOXYCYCLINE 100 MG/1
TABLET ORAL
Qty: 14 TABLET | Refills: 0 | Status: SHIPPED | OUTPATIENT
Start: 2023-12-13

## 2023-12-13 NOTE — PATIENT INSTRUCTIONS
Punch Biopsy Wound Care    Your doctor has performed a punch biopsy today.  A band aid and antibiotic ointment has been placed over the site.  This should remain in place for 24 hours.  It is recommended that you keep the area dry for the first 24 hours.  After 24 hours, you may remove the band aid and wash the area with warm soap and water and apply Vaseline jelly.  Many patients prefer to use Neosporin or Bacitracin ointment.  This is acceptable; however know that you can develop an allergy to this medication even if you have used it safely for years.  It is important to keep the area moist.  Letting it dry out and get air slows healing time, will worsen the scar, and make it more difficult to remove the stitches if they were placed.  Band aid is optional after first 24 hours.      If you notice increasing redness, tenderness, pain, or yellow drainage at the biopsy or surgical site, please notify your doctor.  These are signs of an infection.    If your biopsy/surgical site is bleeding, apply firm pressure for 15 minutes straight.  Repeat for another 15 minutes, if it is still bleeding.   If the surgical site continues to bleed, then please contact your doctor.     [facility] - DERMATOLOGY  [address], [address]  [place] [address]  Dept: [phone]  Dept Fax: [phone]

## 2023-12-13 NOTE — PROGRESS NOTES
Subjective:      Patient ID:  Tyler Hannah is a 73 y.o. male who presents for   Chief Complaint   Patient presents with    Rash     Entire body      New Patient    Patient here today for rash to body x 12 years  Mildly itchy  Pt states rash present with an itch, using Cerave products. No resolution  Dx as eczema in the past, worse in summer time    Uses oatmeal and almond bar of soap. Cerave lotion and cream  No after shave  No home scent devices or sprays  Free and clear detergents, no softener    C/O spot to right ear x couple of months. No symptoms.    Derm Hx:  Denies Phx of NMSC  Denies Fhx of MM    Current Outpatient Medications:   ·  gabapentin (NEURONTIN) 300 MG capsule, Take 1 capsule (300 mg total) by mouth 3 (three) times daily., Disp: 270 capsule, Rfl: 3  ·  meloxicam (MOBIC) 15 MG tablet, Take 1 tablet (15 mg total) by mouth once daily., Disp: 90 tablet, Rfl: 3  ·  albuterol (VENTOLIN HFA) 90 mcg/actuation inhaler, Inhale 2 puffs into the lungs every 4 (four) hours as needed for Wheezing. Rescue (Patient not taking: Reported on 12/13/2023), Disp: 18 g, Rfl: 2  ·  fluticasone propionate (FLONASE) 50 mcg/actuation nasal spray, 1 spray by Each Nostril route once daily., Disp: , Rfl:   ·  KRILL OIL ORAL, Take by mouth 2 (two) times a day., Disp: , Rfl:   ·  timoloL (BETIMOL) 0.5 % ophthalmic solution, Place 1 drop into both eyes 2 (two) times daily., Disp: , Rfl:   ·  triamcinolone acetonide 0.1% (KENALOG) 0.1 % cream, Apply topically as needed (eczema). (Patient not taking: Reported on 5/15/2023), Disp: 454 g, Rfl: 1        Review of Systems   Constitutional:  Negative for fever, chills and fatigue.   Skin:  Positive for itching and rash. Negative for daily sunscreen use and activity-related sunscreen use.       Objective:   Physical Exam   Constitutional: He appears well-developed and well-nourished. No distress.   Neurological: He is alert and oriented to person, place, and time. He is not  disoriented.   Psychiatric: He has a normal mood and affect.   Skin:   Areas Examined (abnormalities noted in diagram):   Scalp / Hair Palpated and Inspected  Head / Face Inspection Performed  Neck Inspection Performed  Chest / Axilla Inspection Performed  Abdomen Inspection Performed  Genitals / Buttocks / Groin Inspection Performed  Back Inspection Performed  RUE Inspected  LUE Inspection Performed  RLE Inspected  LLE Inspection Performed  Nails and Digits Inspection Performed                         Diagram Legend     Erythematous scaling macule/papule c/w actinic keratosis       Vascular papule c/w angioma      Pigmented verrucoid papule/plaque c/w seborrheic keratosis      Yellow umbilicated papule c/w sebaceous hyperplasia      Irregularly shaped tan macule c/w lentigo     1-2 mm smooth white papules consistent with Milia      Movable subcutaneous cyst with punctum c/w epidermal inclusion cyst      Subcutaneous movable cyst c/w pilar cyst      Firm pink to brown papule c/w dermatofibroma      Pedunculated fleshy papule(s) c/w skin tag(s)      Evenly pigmented macule c/w junctional nevus     Mildly variegated pigmented, slightly irregular-bordered macule c/w mildly atypical nevus      Flesh colored to evenly pigmented papule c/w intradermal nevus       Pink pearly papule/plaque c/w basal cell carcinoma      Erythematous hyperkeratotic cursted plaque c/w SCC      Surgical scar with no sign of skin cancer recurrence      Open and closed comedones      Inflammatory papules and pustules      Verrucoid papule consistent consistent with wart     Erythematous eczematous patches and plaques     Dystrophic onycholytic nail with subungual debris c/w onychomycosis     Umbilicated papule    Erythematous-base heme-crusted tan verrucoid plaque consistent with inflamed seborrheic keratosis     Erythematous Silvery Scaling Plaque c/w Psoriasis     See annotation                            Assessment / Plan:      Pathology  Orders:       Normal Orders This Visit    Specimen to Pathology, Dermatology     Comments:    Number of Specimens:->1  ------------------------->-------------------------  Spec 1 Procedure:->Biopsy  Spec 1 Clinical Impression:->spong vs psoriasis vs other  Spec 1 Source:->left upper arm    Questions:    Procedure Type: Dermatology and skin neoplasms    Number of Specimens: 1    ------------------------: -------------------------    Spec 1 Procedure: Biopsy    Spec 1 Clinical Impression: spong vs psoriasis vs other    Spec 1 Source: left upper arm    Release to patient:           Disease of skin and subcutaneous tissue  -     Specimen to Pathology, Dermatology  -     betamethasone dipropionate 0.05 % cream; Spot treat itchy plaques BID  Dispense: 45 g; Refill: 3  -     doxycycline monohydrate 100 mg Tab; Take 1 po BID x 7 days, take with food and tall glass of water  Dispense: 14 tablet; Refill: 0  KOH neg  Punch biopsy procedure note:  Punch biopsy performed after verbal consent obtained. Area marked and prepped with alcohol. Approximately 1cc of 1% lidocaine with epinephrine injected. 4 mm disposable punch used to remove lesion. Hemostasis obtained and biopsy site closed with 1 - 2 Prolene sutures. Wound care instructions reviewed with patient and handout given.    Impetiginized  Favor spong  Ongoing x 14 years+  Retired, sensitive skin care routine    Actinic keratoses  Cryosurgery Procedure Note    Verbal consent from the patient is obtained and the patient is aware of the precancerous quality and need for treatment of these lesions. Liquid nitrogen cryosurgery is applied to the 6 actinic keratoses, as detailed in the physical exam, to produce a freeze injury. The patient is aware that blisters may form and is instructed on wound care with gentle cleansing and use of vaseline ointment to keep moist until healed. The patient is supplied a handout on cryosurgery and is instructed to call if lesions do not  completely resolve.    Seborrheic keratoses  These are benign inherited growths without a malignant potential. Reassurance given to patient. No treatment is necessary.                Follow up in about 2 weeks (around 12/27/2023) for suture removal.

## 2023-12-21 LAB
FINAL PATHOLOGIC DIAGNOSIS: NORMAL
Lab: NORMAL

## 2023-12-28 ENCOUNTER — OFFICE VISIT (OUTPATIENT)
Dept: DERMATOLOGY | Facility: CLINIC | Age: 73
End: 2023-12-28
Payer: MEDICARE

## 2023-12-28 VITALS — HEIGHT: 66 IN | WEIGHT: 195 LBS | BODY MASS INDEX: 31.34 KG/M2

## 2023-12-28 DIAGNOSIS — L20.84 INTRINSIC ATOPIC DERMATITIS: Primary | ICD-10-CM

## 2023-12-28 PROCEDURE — 3288F PR FALLS RISK ASSESSMENT DOCUMENTED: ICD-10-PCS | Mod: CPTII,S$GLB,, | Performed by: DERMATOLOGY

## 2023-12-28 PROCEDURE — 99213 PR OFFICE/OUTPT VISIT, EST, LEVL III, 20-29 MIN: ICD-10-PCS | Mod: S$GLB,,, | Performed by: DERMATOLOGY

## 2023-12-28 PROCEDURE — 1159F MED LIST DOCD IN RCRD: CPT | Mod: CPTII,S$GLB,, | Performed by: DERMATOLOGY

## 2023-12-28 PROCEDURE — 1159F PR MEDICATION LIST DOCUMENTED IN MEDICAL RECORD: ICD-10-PCS | Mod: CPTII,S$GLB,, | Performed by: DERMATOLOGY

## 2023-12-28 PROCEDURE — 3008F PR BODY MASS INDEX (BMI) DOCUMENTED: ICD-10-PCS | Mod: CPTII,S$GLB,, | Performed by: DERMATOLOGY

## 2023-12-28 PROCEDURE — 3288F FALL RISK ASSESSMENT DOCD: CPT | Mod: CPTII,S$GLB,, | Performed by: DERMATOLOGY

## 2023-12-28 PROCEDURE — 1160F PR REVIEW ALL MEDS BY PRESCRIBER/CLIN PHARMACIST DOCUMENTED: ICD-10-PCS | Mod: CPTII,S$GLB,, | Performed by: DERMATOLOGY

## 2023-12-28 PROCEDURE — 3008F BODY MASS INDEX DOCD: CPT | Mod: CPTII,S$GLB,, | Performed by: DERMATOLOGY

## 2023-12-28 PROCEDURE — 99213 OFFICE O/P EST LOW 20 MIN: CPT | Mod: S$GLB,,, | Performed by: DERMATOLOGY

## 2023-12-28 PROCEDURE — 1101F PR PT FALLS ASSESS DOC 0-1 FALLS W/OUT INJ PAST YR: ICD-10-PCS | Mod: CPTII,S$GLB,, | Performed by: DERMATOLOGY

## 2023-12-28 PROCEDURE — 1101F PT FALLS ASSESS-DOCD LE1/YR: CPT | Mod: CPTII,S$GLB,, | Performed by: DERMATOLOGY

## 2023-12-28 PROCEDURE — 1160F RVW MEDS BY RX/DR IN RCRD: CPT | Mod: CPTII,S$GLB,, | Performed by: DERMATOLOGY

## 2023-12-28 NOTE — PROGRESS NOTES
Subjective:      Patient ID:  Tyler Hannah is a 73 y.o. male who presents for   Chief Complaint   Patient presents with    Follow-up     Results review      LOV 12/13/23 Disease of skin and subcutaneous tissue     Patient here today for results review and suture removal  Completed course of Doxycycline and applying betamethasone as instructed.  Pt states he's cleared significantly.     Derm Hx:  Denies Phx of NMSC  Denies Fhx of MM    Current Outpatient Medications:   ·  albuterol (VENTOLIN HFA) 90 mcg/actuation inhaler, Inhale 2 puffs into the lungs every 4 (four) hours as needed for Wheezing. Rescue, Disp: 18 g, Rfl: 2  ·  betamethasone dipropionate 0.05 % cream, Spot treat itchy plaques BID, Disp: 45 g, Rfl: 3  ·  doxycycline monohydrate 100 mg Tab, Take 1 po BID x 7 days, take with food and tall glass of water, Disp: 14 tablet, Rfl: 0  ·  fluticasone propionate (FLONASE) 50 mcg/actuation nasal spray, 1 spray by Each Nostril route once daily., Disp: , Rfl:   ·  gabapentin (NEURONTIN) 300 MG capsule, Take 1 capsule (300 mg total) by mouth 3 (three) times daily., Disp: 270 capsule, Rfl: 3  ·  KRILL OIL ORAL, Take by mouth 2 (two) times a day., Disp: , Rfl:   ·  meloxicam (MOBIC) 15 MG tablet, Take 1 tablet (15 mg total) by mouth once daily., Disp: 90 tablet, Rfl: 3  ·  triamcinolone acetonide 0.1% (KENALOG) 0.1 % cream, Apply topically as needed (eczema)., Disp: 454 g, Rfl: 1          Review of Systems    Objective:   Physical Exam     Diagram Legend     Erythematous scaling macule/papule c/w actinic keratosis       Vascular papule c/w angioma      Pigmented verrucoid papule/plaque c/w seborrheic keratosis      Yellow umbilicated papule c/w sebaceous hyperplasia      Irregularly shaped tan macule c/w lentigo     1-2 mm smooth white papules consistent with Milia      Movable subcutaneous cyst with punctum c/w epidermal inclusion cyst      Subcutaneous movable cyst c/w pilar cyst      Firm pink to brown papule c/w  dermatofibroma      Pedunculated fleshy papule(s) c/w skin tag(s)      Evenly pigmented macule c/w junctional nevus     Mildly variegated pigmented, slightly irregular-bordered macule c/w mildly atypical nevus      Flesh colored to evenly pigmented papule c/w intradermal nevus       Pink pearly papule/plaque c/w basal cell carcinoma      Erythematous hyperkeratotic cursted plaque c/w SCC      Surgical scar with no sign of skin cancer recurrence      Open and closed comedones      Inflammatory papules and pustules      Verrucoid papule consistent consistent with wart     Erythematous eczematous patches and plaques     Dystrophic onycholytic nail with subungual debris c/w onychomycosis     Umbilicated papule    Erythematous-base heme-crusted tan verrucoid plaque consistent with inflamed seborrheic keratosis     Erythematous Silvery Scaling Plaque c/w Psoriasis     See annotation        Assessment / Plan:        Intrinsic atopic dermatitis    Significantly improved with topical steroids and course of doxy  Continue until all plaques are flat and no longer itchy   Sensitive skin care routine  Cerave cream after shower at least daily  Retired   Some home projects    Only PO meds are meloxicam and gabapentin, both several years           No follow-ups on file.

## 2024-04-11 ENCOUNTER — PATIENT OUTREACH (OUTPATIENT)
Dept: ADMINISTRATIVE | Facility: HOSPITAL | Age: 74
End: 2024-04-11
Payer: MEDICARE

## 2024-04-11 NOTE — PROGRESS NOTES
Population Health Chart Review & Patient Outreach Details      Additional [facility] Health Notes:               Updates Requested / Reviewed:      Updated Care Coordination Note, Care Everywhere, , and Immunizations Reconciliation Completed or Queried: [facility] Topics Overdue:      [facility] Score: 1     Colon Cancer Screening    Influenza Vaccine  Tetanus Vaccine  Shingles/Zoster Vaccine  RSV Vaccine                  Health Maintenance Topic(s) Outreach Outcomes & Actions Taken:    Colorectal Cancer Screening - Outreach Outcomes & Actions Taken  : Pt Will Schedule with External Provider / Order Routed & Care Team Updated if Applicable

## 2024-05-20 ENCOUNTER — TELEPHONE (OUTPATIENT)
Dept: FAMILY MEDICINE | Facility: CLINIC | Age: 74
End: 2024-05-20
Payer: MEDICARE

## 2024-05-20 NOTE — TELEPHONE ENCOUNTER
----- Message from Jennifer Gibbons sent at 5/20/2024  3:35 PM CDT -----  Tamika the patients wife called. The patient needs his yearly check and  refills. Zuleima  had an appointment  for tomorrow His wife Tamika is asking if he needs labs 1st.   He uses our [facility] I did not book that appointment. He may need to be worked in because he needs refills. Please call [phone]

## 2024-05-21 DIAGNOSIS — E78.00 ELEVATED LDL CHOLESTEROL LEVEL: ICD-10-CM

## 2024-05-21 DIAGNOSIS — Z00.00 ROUTINE MEDICAL EXAM: ICD-10-CM

## 2024-05-21 DIAGNOSIS — M51.36 DDD (DEGENERATIVE DISC DISEASE), LUMBAR: ICD-10-CM

## 2024-05-21 DIAGNOSIS — I10 PRIMARY HYPERTENSION: ICD-10-CM

## 2024-05-21 DIAGNOSIS — Z79.899 ENCOUNTER FOR LONG-TERM (CURRENT) USE OF OTHER MEDICATIONS: Primary | ICD-10-CM

## 2024-05-21 RX ORDER — GABAPENTIN 300 MG/1
300 CAPSULE ORAL 3 TIMES DAILY
Qty: 270 CAPSULE | Refills: 0 | Status: SHIPPED | OUTPATIENT
Start: 2024-05-21 | End: 2024-06-13 | Stop reason: SDUPTHER

## 2024-05-21 RX ORDER — MELOXICAM 15 MG/1
15 TABLET ORAL DAILY
Qty: 90 TABLET | Refills: 0 | Status: SHIPPED | OUTPATIENT
Start: 2024-05-21 | End: 2024-06-13 | Stop reason: SDUPTHER

## 2024-05-21 NOTE — TELEPHONE ENCOUNTER
----- Message from Manda Najera sent at 5/21/2024 10:51 AM CDT -----  Pt wife brielle is calling about the message from yesterday. They have not received a vito back   [phone]

## 2024-06-13 ENCOUNTER — OFFICE VISIT (OUTPATIENT)
Dept: FAMILY MEDICINE | Facility: CLINIC | Age: 74
End: 2024-06-13
Payer: MEDICARE

## 2024-06-13 VITALS
HEART RATE: 76 BPM | WEIGHT: 194 LBS | OXYGEN SATURATION: 96 % | SYSTOLIC BLOOD PRESSURE: 136 MMHG | DIASTOLIC BLOOD PRESSURE: 78 MMHG | HEIGHT: 67 IN | BODY MASS INDEX: 30.45 KG/M2

## 2024-06-13 DIAGNOSIS — Z00.00 ANNUAL PHYSICAL EXAM: Primary | ICD-10-CM

## 2024-06-13 DIAGNOSIS — Z13.6 ENCOUNTER FOR SCREENING FOR CARDIOVASCULAR DISORDERS: ICD-10-CM

## 2024-06-13 DIAGNOSIS — Z80.0 FAMILY HISTORY OF COLON CANCER IN MOTHER: ICD-10-CM

## 2024-06-13 DIAGNOSIS — M79.10 MYALGIA DUE TO STATIN: ICD-10-CM

## 2024-06-13 DIAGNOSIS — Z01.30 ENCOUNTER FOR EXAMINATION OF BLOOD PRESSURE WITHOUT ABNORMAL FINDINGS: ICD-10-CM

## 2024-06-13 DIAGNOSIS — Z12.5 PROSTATE CANCER SCREENING: ICD-10-CM

## 2024-06-13 DIAGNOSIS — T46.6X5A MYALGIA DUE TO STATIN: ICD-10-CM

## 2024-06-13 DIAGNOSIS — M51.36 DDD (DEGENERATIVE DISC DISEASE), LUMBAR: ICD-10-CM

## 2024-06-13 DIAGNOSIS — J45.20 MILD INTERMITTENT ASTHMA WITHOUT COMPLICATION: ICD-10-CM

## 2024-06-13 PROCEDURE — 3078F DIAST BP <80 MM HG: CPT | Mod: CPTII,S$GLB,, | Performed by: NURSE PRACTITIONER

## 2024-06-13 PROCEDURE — 1101F PT FALLS ASSESS-DOCD LE1/YR: CPT | Mod: CPTII,S$GLB,, | Performed by: NURSE PRACTITIONER

## 2024-06-13 PROCEDURE — 1160F RVW MEDS BY RX/DR IN RCRD: CPT | Mod: CPTII,S$GLB,, | Performed by: NURSE PRACTITIONER

## 2024-06-13 PROCEDURE — 3288F FALL RISK ASSESSMENT DOCD: CPT | Mod: CPTII,S$GLB,, | Performed by: NURSE PRACTITIONER

## 2024-06-13 PROCEDURE — 1126F AMNT PAIN NOTED NONE PRSNT: CPT | Mod: CPTII,S$GLB,, | Performed by: NURSE PRACTITIONER

## 2024-06-13 PROCEDURE — 3008F BODY MASS INDEX DOCD: CPT | Mod: CPTII,S$GLB,, | Performed by: NURSE PRACTITIONER

## 2024-06-13 PROCEDURE — 99214 OFFICE O/P EST MOD 30 MIN: CPT | Mod: S$GLB,,, | Performed by: NURSE PRACTITIONER

## 2024-06-13 PROCEDURE — 1159F MED LIST DOCD IN RCRD: CPT | Mod: CPTII,S$GLB,, | Performed by: NURSE PRACTITIONER

## 2024-06-13 PROCEDURE — 3075F SYST BP GE 130 - 139MM HG: CPT | Mod: CPTII,S$GLB,, | Performed by: NURSE PRACTITIONER

## 2024-06-13 RX ORDER — MELOXICAM 15 MG/1
15 TABLET ORAL DAILY
Qty: 90 TABLET | Refills: 1 | Status: SHIPPED | OUTPATIENT
Start: 2024-06-13

## 2024-06-13 RX ORDER — AZELASTINE 1 MG/ML
1 SPRAY, METERED NASAL
COMMUNITY
Start: 2024-05-30 | End: 2025-05-30

## 2024-06-13 RX ORDER — GABAPENTIN 300 MG/1
300 CAPSULE ORAL 3 TIMES DAILY
Qty: 270 CAPSULE | Refills: 3 | Status: SHIPPED | OUTPATIENT
Start: 2024-06-13

## 2024-06-13 RX ORDER — ALBUTEROL SULFATE 90 UG/1
2 AEROSOL, METERED RESPIRATORY (INHALATION) EVERY 4 HOURS PRN
Qty: 18 G | Refills: 2 | Status: SHIPPED | OUTPATIENT
Start: 2024-06-13

## 2024-06-13 RX ORDER — DORZOLAMIDE HYDROCHLORIDE AND TIMOLOL MALEATE 20; 5 MG/ML; MG/ML
SOLUTION/ DROPS OPHTHALMIC
COMMUNITY
Start: 2024-05-05

## 2024-06-13 NOTE — PROGRESS NOTES
SUBJECTIVE:    Patient ID: Tyler Hannah is a 73 y.o. male.    Chief Complaint: Annual Exam (Bottles brought//pt here for annual//declines colo//JL)    Pt here for annual visit- lumbar DDD. Pt here with his wife today    Pt reports overall he's doing okay. No c/o's today    Reports went and saw Dr. Jenkins, ENT for nasal congestion a few weeks ago- was given RX for azelastine which helps a little    Hx of lumbar DDD- stable on gabapentin.  Denies sciatica or leg weakness.  No falls    Mother had hx of colon CA-  at age 42- pt had cscope age 64 with Dr. Duque and had 3 polyps removed. Told to repeat in 3 years but he has declined further cscope. had cologuard in  which was negative and refuses further cscope    Reports history of asthma since childhood, uses rescue inhaler about once a month if he gets really short of breath with exertion.  Denies nocturnal cough or shortness of breath        No visits with results within 6 Month(s) from this visit.   Latest known visit with results is:   Office Visit on 2023   Component Date Value Ref Range Status    Final Pathologic Diagnosis 2023    Final                    Value:[facility] DIAGNOSIS:    SKIN, LEFT UPPER ARM, PUNCH BIOPSY:  - Subacute spongiotic dermatitis with a mixed inflammatory infiltrate including eosinophils, see comment.    - No fungal organisms identified with *PAS-F stain.    Comment:  The histologic differential diagnosis includes contact dermatitis, nummular dermatitis, a spongiotic drug eruption and other eczematous dermatides. Clinical correlation is recommended.      ANN LIM M.D.       Report attached.      Performing site:  [address]  [address] [place] [address]  [id number]    Disclaimer:  This case diagnosis was rendered completely by the outside consultation pathologist and the case is electronically signed by an [facility] pathologist listed below solely to release the report into the medical record.       Disclaimer 12/13/2023 Unless the case is a 'gross only' or additional testing only, the final diagnosis for each specimen is based on a microscopic examination of appropriate tissue sections.   Final       Past Medical History:   Diagnosis Date    Arthritis     Asthma     Family history of colon cancer in mother 03/05/2020     Past Surgical History:   Procedure Laterality Date    right arm      TRANSFORAMINAL EPIDURAL INJECTION OF STEROID Right 5/21/2019    Procedure: Injection,steroid,epidural,transforaminal approach;  Surgeon: Blade Devine MD;  Location: [facility];  Service: Pain Management;  Laterality: Right;  C6, C7     Family History   Problem Relation Name Age of Onset    Cancer Mother  41        colon CA    Diabetes Father      Heart disease Father      No Known Problems Sister         Tests to Keep You Healthy    Colon Cancer Screening: DUE      The 10-year CVD risk score (ELINOR'Agostino, et al., 2008) is: 29.1%    Values used to calculate the score:      Age: 73 years      Sex: Male      Diabetic: No      Tobacco smoker: No      Systolic Blood Pressure: 136 mmHg      Is BP treated: No      HDL Cholesterol: 48 mg/dL      Total Cholesterol: 232 mg/dL     Marital Status:   Alcohol History:  reports current alcohol use.  Tobacco History:  reports that he has never smoked. He has never been exposed to tobacco smoke. He has never used smokeless tobacco.  Drug History:  has no history on file for drug use.    Health Maintenance Topics with due status: Not Due       Topic Last Completion Date    Influenza Vaccine 10/24/2022    Lipid Panel 05/10/2023     Immunization History   Administered Date(s) Administered    COVID-19, MRNA, LN-S, PF (Pfizer) (Purple Cap) 04/16/2021, 05/07/2021    Influenza 10/09/2014    Influenza (FLUAD) - Quadrivalent - Adjuvanted - PF *Preferred* (65+) 10/24/2022    Influenza (FLUAD) - Trivalent - Adjuvanted - PF (65+) 11/14/2018, 10/23/2019    Influenza - High Dose - PF (65 years and  older) 11/27/2017, 10/23/2019    Influenza - Quadrivalent 10/13/2015    Influenza - Quadrivalent - High Dose - PF (65 years and older) 10/12/2020, 10/21/2021    Influenza - Quadrivalent - PF *Preferred* (6 months and older) 11/29/2016    Pneumococcal Conjugate - 13 Valent 06/04/2019    Pneumococcal Polysaccharide - 23 Valent 09/01/2020       Review of patient's allergies indicates:   Allergen Reactions    Statins-hmg-coa reductase inhibitors      Severe muscle pain       Current Outpatient Medications:     azelastine (ASTELIN) 137 mcg (0.1 %) nasal spray, 1 spray by Nasal route., Disp: , Rfl:     betamethasone dipropionate 0.05 % cream, Spot treat itchy plaques BID, Disp: 45 g, Rfl: 3    dorzolamide-timolol 2-0.5% (COSOPT) 22.3-6.8 mg/mL ophthalmic solution, INSTILL 1 DROP INTO EACH EYE TWICE DAILY FOR 30 DAYS, Disp: , Rfl:     albuterol (VENTOLIN HFA) 90 mcg/actuation inhaler, Inhale 2 puffs into the lungs every 4 (four) hours as needed for Wheezing. Rescue, Disp: 18 g, Rfl: 2    fluticasone propionate (FLONASE) 50 mcg/actuation nasal spray, 1 spray by Each Nostril route once daily. (Patient not taking: Reported on 6/13/2024), Disp: , Rfl:     gabapentin (NEURONTIN) 300 MG capsule, Take 1 capsule (300 mg total) by mouth 3 (three) times daily., Disp: 270 capsule, Rfl: 3    KRILL OIL ORAL, Take by mouth 2 (two) times a day. (Patient not taking: Reported on 6/13/2024), Disp: , Rfl:     meloxicam (MOBIC) 15 MG tablet, Take 1 tablet (15 mg total) by mouth once daily., Disp: 90 tablet, Rfl: 1    triamcinolone acetonide 0.1% (KENALOG) 0.1 % cream, Apply topically as needed (eczema). (Patient not taking: Reported on 6/13/2024), Disp: 454 g, Rfl: 1    Review of Systems   Constitutional:  Negative for activity change, appetite change, chills, fever and unexpected weight change.   HENT:  Positive for congestion, hearing loss and rhinorrhea. Negative for sore throat and trouble swallowing.    Eyes:  Negative for discharge and  "visual disturbance.   Respiratory:  Positive for wheezing (occasional, uses albuterol about once a month). Negative for cough, chest tightness and shortness of breath.    Cardiovascular:  Negative for chest pain, palpitations and leg swelling.   Gastrointestinal:  Negative for abdominal pain, blood in stool, constipation, diarrhea and vomiting.   Endocrine: Negative for polydipsia and polyuria.   Genitourinary:  Negative for difficulty urinating, dysuria, frequency, hematuria and urgency.   Musculoskeletal:  Positive for back pain (across lower back, stable). Negative for arthralgias, gait problem, joint swelling and neck pain.   Skin:  Negative for rash.   Neurological:  Negative for dizziness, syncope, speech difficulty, weakness, numbness and headaches.   Psychiatric/Behavioral:  Negative for confusion and dysphoric mood. The patient is not nervous/anxious.           Objective:      Vitals:    06/13/24 0757   BP: 136/78   Pulse: 76   SpO2: 96%   Weight: 88 kg (194 lb)   Height: 5' 7" (1.702 m)     Physical Exam  Vitals and nursing note reviewed.   Constitutional:       General: He is not in acute distress.     Appearance: Normal appearance. He is well-developed and normal weight.   HENT:      Head: Normocephalic and atraumatic.      Right Ear: Tympanic membrane and ear canal normal.      Left Ear: Tympanic membrane and ear canal normal.   Neck:      Vascular: No carotid bruit.   Cardiovascular:      Rate and Rhythm: Normal rate and regular rhythm.      Heart sounds: No murmur heard.  Pulmonary:      Effort: Pulmonary effort is normal. No respiratory distress.      Breath sounds: Normal breath sounds. No wheezing or rales.   Abdominal:      General: There is no distension.      Palpations: Abdomen is soft.      Tenderness: There is no abdominal tenderness.   Musculoskeletal:      Cervical back: Neck supple.      Right lower leg: No edema.      Left lower leg: No edema.   Lymphadenopathy:      Cervical: No cervical " adenopathy.   Skin:     General: Skin is warm and dry.      Findings: No rash.   Neurological:      General: No focal deficit present.      Mental Status: He is alert and oriented to person, place, and time.      Gait: Gait normal.           Assessment:       1. Annual physical exam    2. DDD (degenerative disc disease), lumbar    3. Mild intermittent asthma without complication    4. Myalgia due to statin    5. Prostate cancer screening    6. Encounter for examination of blood pressure without abnormal findings    7. Encounter for screening for cardiovascular disorders    8. Family history of colon cancer in mother           Plan:       1. Annual physical exam  -due for annual labs  -     CBC Auto Differential; Future; Expected date: 06/13/2024  -     Comprehensive Metabolic Panel; Future; Expected date: 06/13/2024  -     Lipid Panel; Future; Expected date: 06/13/2024  -     Urinalysis, Reflex to Urine Culture Urine, Clean Catch; Future  -     TSH w/reflex to FT4; Future; Expected date: 06/13/2024  -     Microalbumin/Creatinine Ratio, Urine; Future    2. DDD (degenerative disc disease), lumbar  -pt reports doing well on gabapentin  -     gabapentin (NEURONTIN) 300 MG capsule; Take 1 capsule (300 mg total) by mouth 3 (three) times daily.  Dispense: 270 capsule; Refill: 3  -     meloxicam (MOBIC) 15 MG tablet; Take 1 tablet (15 mg total) by mouth once daily.  Dispense: 90 tablet; Refill: 1    3. Mild intermittent asthma without complication  -stable, uses albuterol about once a month  -     albuterol (VENTOLIN HFA) 90 mcg/actuation inhaler; Inhale 2 puffs into the lungs every 4 (four) hours as needed for Wheezing. Rescue  Dispense: 18 g; Refill: 2    4. Myalgia due to statin     5. Prostate cancer screening  -     PSA, Screening; Future; Expected date: 06/13/2024    6. Encounter for examination of blood pressure without abnormal findings  -     CBC Auto Differential; Future; Expected date: 06/13/2024    7. Encounter  for screening for cardiovascular disorders  -     Lipid Panel; Future; Expected date: 06/13/2024    8. Family history of colon cancer in mother   -discussed with pt/wife imp of colon CA screening melonie given his mother's hx. He continues to decline cscope and we discussed cologuard is really not recommended given FH of colon CA    Follow up in about 1 year (around 6/13/2025) for labs within next 2 weeks, annual visit.          Counseled on age and gender appropriate medical preventative services, including cancer screenings, immunizations, overall nutritional health, need for a consistent exercise regimen and an overall push towards maintaining a vigorous and active lifestyle.      6/13/2024 Zuleima Caballero NP

## 2024-06-18 LAB
ALBUMIN SERPL-MCNC: 4.5 G/DL (ref 3.6–5.1)
ALBUMIN/CREAT UR: 6 MG/G CREAT
ALBUMIN/GLOB SERPL: 1.7 (CALC) (ref 1–2.5)
ALP SERPL-CCNC: 50 U/L (ref 35–144)
ALT SERPL-CCNC: 10 U/L (ref 9–46)
APPEARANCE UR: CLEAR
AST SERPL-CCNC: 18 U/L (ref 10–35)
BACTERIA #/AREA URNS HPF: NORMAL /HPF
BACTERIA UR CULT: NORMAL
BASOPHILS # BLD AUTO: 28 CELLS/UL (ref 0–200)
BASOPHILS NFR BLD AUTO: 0.6 %
BILIRUB SERPL-MCNC: 0.6 MG/DL (ref 0.2–1.2)
BILIRUB UR QL STRIP: NEGATIVE
BUN SERPL-MCNC: 14 MG/DL (ref 7–25)
BUN/CREAT SERPL: NORMAL (CALC) (ref 6–22)
CALCIUM SERPL-MCNC: 9.3 MG/DL (ref 8.6–10.3)
CHLORIDE SERPL-SCNC: 103 MMOL/L (ref 98–110)
CHOLEST SERPL-MCNC: 226 MG/DL
CHOLEST/HDLC SERPL: 4.8 (CALC)
CO2 SERPL-SCNC: 27 MMOL/L (ref 20–32)
COLOR UR: YELLOW
CREAT SERPL-MCNC: 0.73 MG/DL (ref 0.7–1.28)
CREAT UR-MCNC: 134 MG/DL (ref 20–320)
EGFR: 97 ML/MIN/1.73M2
EOSINOPHIL # BLD AUTO: 291 CELLS/UL (ref 15–500)
EOSINOPHIL NFR BLD AUTO: 6.2 %
ERYTHROCYTE [DISTWIDTH] IN BLOOD BY AUTOMATED COUNT: 12.7 % (ref 11–15)
GLOBULIN SER CALC-MCNC: 2.7 G/DL (CALC) (ref 1.9–3.7)
GLUCOSE SERPL-MCNC: 96 MG/DL (ref 65–99)
GLUCOSE UR QL STRIP: NEGATIVE
HCT VFR BLD AUTO: 51.8 % (ref 38.5–50)
HDLC SERPL-MCNC: 47 MG/DL
HGB BLD-MCNC: 17 G/DL (ref 13.2–17.1)
HGB UR QL STRIP: NEGATIVE
HYALINE CASTS #/AREA URNS LPF: NORMAL /LPF
KETONES UR QL STRIP: NEGATIVE
LDLC SERPL CALC-MCNC: 139 MG/DL (CALC)
LEUKOCYTE ESTERASE UR QL STRIP: NEGATIVE
LYMPHOCYTES # BLD AUTO: 954 CELLS/UL (ref 850–3900)
LYMPHOCYTES NFR BLD AUTO: 20.3 %
MCH RBC QN AUTO: 30.5 PG (ref 27–33)
MCHC RBC AUTO-ENTMCNC: 32.8 G/DL (ref 32–36)
MCV RBC AUTO: 93 FL (ref 80–100)
MICROALBUMIN UR-MCNC: 0.8 MG/DL
MONOCYTES # BLD AUTO: 310 CELLS/UL (ref 200–950)
MONOCYTES NFR BLD AUTO: 6.6 %
NEUTROPHILS # BLD AUTO: 3116 CELLS/UL (ref 1500–7800)
NEUTROPHILS NFR BLD AUTO: 66.3 %
NITRITE UR QL STRIP: NEGATIVE
NONHDLC SERPL-MCNC: 179 MG/DL (CALC)
PH UR STRIP: 6.5 [PH] (ref 5–8)
PLATELET # BLD AUTO: 182 THOUSAND/UL (ref 140–400)
PMV BLD REES-ECKER: 10.1 FL (ref 7.5–12.5)
POTASSIUM SERPL-SCNC: 5 MMOL/L (ref 3.5–5.3)
PROT SERPL-MCNC: 7.2 G/DL (ref 6.1–8.1)
PROT UR QL STRIP: NEGATIVE
PSA SERPL-MCNC: 2.14 NG/ML
RBC # BLD AUTO: 5.57 MILLION/UL (ref 4.2–5.8)
RBC #/AREA URNS HPF: NORMAL /HPF
SERVICE CMNT-IMP: NORMAL
SODIUM SERPL-SCNC: 138 MMOL/L (ref 135–146)
SP GR UR STRIP: 1.02 (ref 1–1.03)
SQUAMOUS #/AREA URNS HPF: NORMAL /HPF
TRIGL SERPL-MCNC: 246 MG/DL
TSH SERPL-ACNC: 1.28 MIU/L (ref 0.4–4.5)
WBC # BLD AUTO: 4.7 THOUSAND/UL (ref 3.8–10.8)
WBC #/AREA URNS HPF: NORMAL /HPF

## 2024-09-18 ENCOUNTER — TELEPHONE (OUTPATIENT)
Dept: PULMONOLOGY | Facility: CLINIC | Age: 74
End: 2024-09-18
Payer: MEDICARE

## 2024-09-18 NOTE — TELEPHONE ENCOUNTER
----- Message from Karma Robles sent at 9/18/2024  1:45 PM CDT -----  Pt is struggling breathing. Currently using a CPAP machine. Pt wife wants to schedule him an appt.  Callback # [phone]

## 2024-09-19 ENCOUNTER — OFFICE VISIT (OUTPATIENT)
Dept: PULMONOLOGY | Facility: CLINIC | Age: 74
End: 2024-09-19
Payer: MEDICARE

## 2024-09-19 ENCOUNTER — TELEPHONE (OUTPATIENT)
Dept: PULMONOLOGY | Facility: CLINIC | Age: 74
End: 2024-09-19

## 2024-09-19 ENCOUNTER — HOSPITAL ENCOUNTER (OUTPATIENT)
Dept: RADIOLOGY | Facility: HOSPITAL | Age: 74
Discharge: HOME | End: 2024-09-19
Attending: NURSE PRACTITIONER
Payer: MEDICARE

## 2024-09-19 VITALS
DIASTOLIC BLOOD PRESSURE: 70 MMHG | HEART RATE: 88 BPM | OXYGEN SATURATION: 97 % | SYSTOLIC BLOOD PRESSURE: 146 MMHG | BODY MASS INDEX: 31.47 KG/M2 | HEIGHT: 66 IN | WEIGHT: 195.81 LBS

## 2024-09-19 DIAGNOSIS — R06.00 DYSPNEA, UNSPECIFIED TYPE: ICD-10-CM

## 2024-09-19 DIAGNOSIS — J45.40 MODERATE PERSISTENT ASTHMA, UNSPECIFIED WHETHER COMPLICATED: Primary | ICD-10-CM

## 2024-09-19 PROCEDURE — 1101F PT FALLS ASSESS-DOCD LE1/YR: CPT | Mod: CPTII,S$GLB,, | Performed by: NURSE PRACTITIONER

## 2024-09-19 PROCEDURE — 3077F SYST BP >= 140 MM HG: CPT | Mod: CPTII,S$GLB,, | Performed by: NURSE PRACTITIONER

## 2024-09-19 PROCEDURE — 1159F MED LIST DOCD IN RCRD: CPT | Mod: CPTII,S$GLB,, | Performed by: NURSE PRACTITIONER

## 2024-09-19 PROCEDURE — 3288F FALL RISK ASSESSMENT DOCD: CPT | Mod: CPTII,S$GLB,, | Performed by: NURSE PRACTITIONER

## 2024-09-19 PROCEDURE — 71046 X-RAY EXAM CHEST 2 VIEWS: CPT | Mod: 26,,, | Performed by: RADIOLOGY

## 2024-09-19 PROCEDURE — 99999 PR PBB SHADOW E&M-EST. PATIENT-LVL III: CPT | Mod: PBBFAC,,, | Performed by: NURSE PRACTITIONER

## 2024-09-19 PROCEDURE — 3078F DIAST BP <80 MM HG: CPT | Mod: CPTII,S$GLB,, | Performed by: NURSE PRACTITIONER

## 2024-09-19 PROCEDURE — 3008F BODY MASS INDEX DOCD: CPT | Mod: CPTII,S$GLB,, | Performed by: NURSE PRACTITIONER

## 2024-09-19 PROCEDURE — 71046 X-RAY EXAM CHEST 2 VIEWS: CPT | Mod: TC

## 2024-09-19 PROCEDURE — 1126F AMNT PAIN NOTED NONE PRSNT: CPT | Mod: CPTII,S$GLB,, | Performed by: NURSE PRACTITIONER

## 2024-09-19 PROCEDURE — 99204 OFFICE O/P NEW MOD 45 MIN: CPT | Mod: S$GLB,,, | Performed by: NURSE PRACTITIONER

## 2024-09-19 RX ORDER — ALBUTEROL SULFATE 1.25 MG/3ML
1.25 SOLUTION RESPIRATORY (INHALATION) EVERY 6 HOURS PRN
Qty: 75 ML | Refills: 6 | Status: SHIPPED | OUTPATIENT
Start: 2024-09-19 | End: 2025-09-19

## 2024-09-19 RX ORDER — MONTELUKAST SODIUM 10 MG/1
10 TABLET ORAL NIGHTLY
Qty: 30 TABLET | Refills: 6 | Status: SHIPPED | OUTPATIENT
Start: 2024-09-19 | End: 2024-10-19

## 2024-09-19 RX ORDER — BUDESONIDE AND FORMOTEROL FUMARATE DIHYDRATE 80; 4.5 UG/1; UG/1
2 AEROSOL RESPIRATORY (INHALATION) 2 TIMES DAILY
Qty: 10.2 G | Refills: 6 | Status: SHIPPED | OUTPATIENT
Start: 2024-09-19 | End: 2025-09-19

## 2024-09-19 NOTE — PATIENT INSTRUCTIONS
Start Symbicort 2 puffs twice a day, rinse after you use it  Chest xray   PFT  Albuterol is as needed every 4-6 hours for shortness of breath, wheeze, cough  Do not use Albuterol inhaler and nebulizer at the same time.   Singulair 10mg at night    Cbc, Ige

## 2024-09-19 NOTE — PROGRESS NOTES
SUBJECTIVE:    Patient ID: Tyler Hannah is a 73 y.o. male.    Chief Complaint: New Patient (New Patient/Here for SOB, asthma, on CPAP machine)    HPI    Patient here today with increased dyspnea over the last two weeks.  He states that he has had asthma since he was a child.  He denies use of any maintenance inhalers, has only used albuterol as needed.  He has been using his grandsons nebulized albuterol twice a day recently.  His  Peak flow is 375 initially, up to 450 after albuterol while in office.  (green is 430 and above.)  He did appear short of breath with talking at initial start of visit, after taught appropriate technique with his Albuterol inhaler and use, he was talking in full sentences without shortness of breath. He states he does have bad allergies, not able to cut grass or do some things without wearing a mask now.  He does have RAULITO and states he is sleeping on CPAP faithfully, it is managed by another provider.   Past Medical History:   Diagnosis Date    Arthritis     Asthma     Family history of colon cancer in mother 03/05/2020    RAULITO (obstructive sleep apnea)      Past Surgical History:   Procedure Laterality Date    right arm      TRANSFORAMINAL EPIDURAL INJECTION OF STEROID Right 5/21/2019    Procedure: Injection,steroid,epidural,transforaminal approach;  Surgeon: Blade Devine MD;  Location: [facility];  Service: Pain Management;  Laterality: Right;  C6, C7     Family History   Problem Relation Name Age of Onset    Cancer Mother  41        colon CA    Diabetes Father      Heart disease Father      No Known Problems Sister          Social History:   Marital Status:   Occupation:  and   Alcohol History:  reports current alcohol use.  Tobacco History:  reports that he has never smoked. He has never been exposed to tobacco smoke. He has never used smokeless tobacco.  Drug History:  has no history on file for drug use.    Review of patient's allergies indicates:    Allergen Reactions    Statins-hmg-coa reductase inhibitors      Severe muscle pain       Current Outpatient Medications   Medication Sig Dispense Refill    albuterol (VENTOLIN HFA) 90 mcg/actuation inhaler Inhale 2 puffs into the lungs every 4 (four) hours as needed for Wheezing. Rescue 18 g 2    azelastine (ASTELIN) 137 mcg (0.1 %) nasal spray 1 spray by Nasal route.      betamethasone dipropionate 0.05 % cream Spot treat itchy plaques BID 45 g 3    dorzolamide-timolol 2-0.5% (COSOPT) 22.3-6.8 mg/mL ophthalmic solution INSTILL 1 DROP INTO EACH EYE TWICE DAILY FOR 30 DAYS      gabapentin (NEURONTIN) 300 MG capsule Take 1 capsule (300 mg total) by mouth 3 (three) times daily. 270 capsule 3    meloxicam (MOBIC) 15 MG tablet Take 1 tablet (15 mg total) by mouth once daily. 90 tablet 1    albuterol (ACCUNEB) 1.25 mg/3 mL Nebu Take 3 mLs (1.25 mg total) by nebulization every 6 (six) hours as needed (cough, shortness of breath). Rescue 75 mL 6    budesonide-formoterol 80-4.5 mcg (SYMBICORT) 80-4.5 mcg/actuation HFAA Inhale 2 puffs into the lungs 2 (two) times a day. Controller 10.2 g 6    fluticasone propionate (FLONASE) 50 mcg/actuation nasal spray 1 spray by Each Nostril route once daily. (Patient not taking: Reported on 6/13/2024)      KRILL OIL ORAL Take by mouth 2 (two) times a day. (Patient not taking: Reported on 6/13/2024)      montelukast (SINGULAIR) 10 mg tablet Take 1 tablet (10 mg total) by mouth every evening. 30 tablet 6    triamcinolone acetonide 0.1% (KENALOG) 0.1 % cream Apply topically as needed (eczema). (Patient not taking: Reported on 6/13/2024) 454 g 1     No current facility-administered medications for this visit.           Review of Systems  General: Feeling Well.  Eyes: Vision is good.  ENT:  difficulty breathing through nose .   Heart:: No chest pain or palpitations.  Lungs: increased dyspnea and wheeze over the last two weeks.  GI: No Nausea, vomiting, constipation, diarrhea, or reflux.  :  "No dysuria, hesitancy, or nocturia.  Musculoskeletal: No joint pain or myalgias.  Skin: No lesions or rashes.  Neuro: No headaches or neuropathy.  Lymph: No edema or adenopathy.  Psych: No anxiety or depression.  Endo: No weight change.    OBJECTIVE:      BP (!) 146/70 (BP Location: Right arm, Patient Position: Sitting, BP Method: Medium (Manual))   Pulse 88   Ht 5' 6" (1.676 m)   Wt 88.8 kg (195 lb 12.8 oz)   SpO2 97%   BMI 31.60 kg/m²     Physical Exam  GENERAL: Older patient in no distress.  HEENT: Pupils equal and reactive. Extraocular movements intact. Nose intact.      Pharynx moist.  NECK: Supple.   HEART: Regular rate and rhythm. No murmur or gallop auscultated.  LUNGS: Clear to auscultation and percussion. Lung excursion symmetrical. No change in fremitus. No adventitial noises.  ABDOMEN: Bowel sounds present. Non-tender, no masses palpated.  EXTREMITIES: Normal muscle tone and joint movement, no cyanosis or clubbing.   LYMPHATICS: No adenopathy palpated, no edema.  SKIN: Dry, intact, no lesions.   NEURO: Cranial nerves II-XII intact. Motor strength 5/5 bilaterally, upper and lower extremities.  PSYCH: Appropriate affect.    Assessment:       1. Moderate persistent asthma, unspecified whether complicated    2. Dyspnea, unspecified type          Plan:       Moderate persistent asthma, unspecified whether complicated  -     CBC W/ AUTO DIFFERENTIAL; Future; Expected date: 09/19/2024  -     IGE; Future; Expected date: 09/19/2024    Dyspnea, unspecified type  -     X-Ray Chest PA And Lateral; Future  -     Complete PFT with bronchodilator; Future    Other orders  -     budesonide-formoterol 80-4.5 mcg (SYMBICORT) 80-4.5 mcg/actuation HFAA; Inhale 2 puffs into the lungs 2 (two) times a day. Controller  Dispense: 10.2 g; Refill: 6  -     montelukast (SINGULAIR) 10 mg tablet; Take 1 tablet (10 mg total) by mouth every evening.  Dispense: 30 tablet; Refill: 6  -     albuterol (ACCUNEB) 1.25 mg/3 mL Nebu; Take " 3 mLs (1.25 mg total) by nebulization every 6 (six) hours as needed (cough, shortness of breath). Rescue  Dispense: 75 mL; Refill: 6         Follow up in about 3 weeks (around 10/10/2024).      Start Symbicort 2 puffs twice a day, rinse after you use it  Chest xray   PFT  Albuterol is as needed every 4-6 hours for shortness of breath, wheeze, cough  Do not use Albuterol inhaler and nebulizer at the same time.   Singulair 10mg at night    Cbc, Ige   Will do peak flow zones at next visit, when he has settled.

## 2024-09-23 ENCOUNTER — TELEPHONE (OUTPATIENT)
Dept: PULMONOLOGY | Facility: CLINIC | Age: 74
End: 2024-09-23
Payer: MEDICARE

## 2024-09-26 ENCOUNTER — TELEPHONE (OUTPATIENT)
Dept: PULMONOLOGY | Facility: CLINIC | Age: 74
End: 2024-09-26

## 2024-09-26 ENCOUNTER — HOSPITAL ENCOUNTER (OUTPATIENT)
Dept: PULMONOLOGY | Facility: HOSPITAL | Age: 74
Discharge: HOME | End: 2024-09-26
Attending: NURSE PRACTITIONER
Payer: MEDICARE

## 2024-09-26 DIAGNOSIS — R06.00 DYSPNEA, UNSPECIFIED TYPE: ICD-10-CM

## 2024-09-26 LAB
DLCO SINGLE BREATH LLN: 16.69
DLCO SINGLE BREATH PRE REF: 122.2 %
DLCO SINGLE BREATH REF: 23.62
DLCOC SBVA LLN: 2.45
DLCOC SBVA REF: 3.74
DLCOC SINGLE BREATH LLN: 16.69
DLCOC SINGLE BREATH REF: 23.62
DLCOVA LLN: 2.45
DLCOVA PRE REF: 130.6 %
DLCOVA PRE: 4.88 ML/(MIN*MMHG*L) (ref 2.45–5.03)
DLCOVA REF: 3.74
ERVN2 LLN: -16449.06
ERVN2 PRE REF: 0 %
ERVN2 PRE: 0 L (ref -16449.06–16450.94)
ERVN2 REF: 0.94
FEF 25 75 CHG: -8.6 %
FEF 25 75 LLN: 1.19
FEF 25 75 POST REF: 65.8 %
FEF 25 75 PRE REF: 72 %
FEF 25 75 REF: 2.9
FET100 CHG: 14 %
FEV1 CHG: 0.3 %
FEV1 FVC CHG: -0.3 %
FEV1 FVC LLN: 63
FEV1 FVC POST REF: 99.5 %
FEV1 FVC PRE REF: 99.8 %
FEV1 FVC REF: 76
FEV1 LLN: 1.98
FEV1 POST REF: 96.3 %
FEV1 PRE REF: 96 %
FEV1 REF: 2.76
FRCN2 LLN: 2.48
FRCN2 PRE REF: 88.2 %
FRCN2 REF: 3.47
FVC CHG: 0.6 %
FVC LLN: 2.67
FVC POST REF: 96.5 %
FVC PRE REF: 95.8 %
FVC REF: 3.62
IVC PRE: 3.24 L (ref 2.67–4.59)
IVC SINGLE BREATH LLN: 2.67
IVC SINGLE BREATH PRE REF: 89.3 %
IVC SINGLE BREATH REF: 3.62
PEF CHG: 4.2 %
PEF LLN: 5.32
PEF POST REF: 136.2 %
PEF PRE REF: 130.7 %
PEF REF: 7.38
POST FEF 25 75: 1.91 L/S (ref 1.19–4.61)
POST FET 100: 6.95 SEC
POST FEV1 FVC: 76.04 % (ref 62.62–88.67)
POST FEV1: 2.66 L (ref 1.98–3.49)
POST FVC: 3.49 L (ref 2.67–4.59)
POST PEF: 10.05 L/S (ref 5.32–9.44)
PRE DLCO: 28.85 ML/(MIN*MMHG) (ref 16.69–30.54)
PRE FEF 25 75: 2.09 L/S (ref 1.19–4.61)
PRE FET 100: 6.1 SEC
PRE FEV1 FVC: 76.29 % (ref 62.62–88.67)
PRE FEV1: 2.65 L (ref 1.98–3.49)
PRE FRC N2: 3.06 L (ref 2.48–4.46)
PRE FVC: 3.47 L (ref 2.67–4.59)
PRE PEF: 9.65 L/S (ref 5.32–9.44)
RVN2 LLN: 1.85
RVN2 PRE REF: 121.1 %
RVN2 PRE: 3.06 L (ref 1.85–3.2)
RVN2 REF: 2.53
RVN2TLCN2 LLN: 32.67
RVN2TLCN2 PRE REF: 112.5 %
RVN2TLCN2 PRE: 46.87 % (ref 32.67–50.63)
RVN2TLCN2 REF: 41.65
TLCN2 LLN: 5.16
TLCN2 PRE REF: 103.5 %
TLCN2 PRE: 6.53 L (ref 5.16–7.47)
TLCN2 REF: 6.31
VA PRE: 5.91 L (ref 6.16–6.16)
VA SINGLE BREATH LLN: 6.16
VA SINGLE BREATH PRE REF: 95.8 %
VA SINGLE BREATH REF: 6.16
VCMAXN2 LLN: 2.67
VCMAXN2 PRE REF: 95.8 %
VCMAXN2 PRE: 3.47 L (ref 2.67–4.59)
VCMAXN2 REF: 3.62

## 2024-09-26 PROCEDURE — 94060 EVALUATION OF WHEEZING: CPT

## 2024-09-26 PROCEDURE — 94727 GAS DIL/WSHOT DETER LNG VOL: CPT

## 2024-09-26 PROCEDURE — 94729 DIFFUSING CAPACITY: CPT

## 2024-10-15 ENCOUNTER — OFFICE VISIT (OUTPATIENT)
Dept: PULMONOLOGY | Facility: CLINIC | Age: 74
End: 2024-10-15
Payer: MEDICARE

## 2024-10-15 VITALS
OXYGEN SATURATION: 96 % | WEIGHT: 198 LBS | HEIGHT: 66 IN | BODY MASS INDEX: 31.82 KG/M2 | SYSTOLIC BLOOD PRESSURE: 160 MMHG | DIASTOLIC BLOOD PRESSURE: 90 MMHG | HEART RATE: 88 BPM

## 2024-10-15 DIAGNOSIS — J45.40 MODERATE PERSISTENT ASTHMA, UNSPECIFIED WHETHER COMPLICATED: Primary | ICD-10-CM

## 2024-10-15 PROCEDURE — 3080F DIAST BP >= 90 MM HG: CPT | Mod: CPTII,S$GLB,, | Performed by: NURSE PRACTITIONER

## 2024-10-15 PROCEDURE — 1125F AMNT PAIN NOTED PAIN PRSNT: CPT | Mod: CPTII,S$GLB,, | Performed by: NURSE PRACTITIONER

## 2024-10-15 PROCEDURE — 3077F SYST BP >= 140 MM HG: CPT | Mod: CPTII,S$GLB,, | Performed by: NURSE PRACTITIONER

## 2024-10-15 PROCEDURE — 99999 PR PBB SHADOW E&M-EST. PATIENT-LVL IV: CPT | Mod: PBBFAC,,, | Performed by: NURSE PRACTITIONER

## 2024-10-15 PROCEDURE — 3008F BODY MASS INDEX DOCD: CPT | Mod: CPTII,S$GLB,, | Performed by: NURSE PRACTITIONER

## 2024-10-15 PROCEDURE — 1159F MED LIST DOCD IN RCRD: CPT | Mod: CPTII,S$GLB,, | Performed by: NURSE PRACTITIONER

## 2024-10-15 PROCEDURE — 99213 OFFICE O/P EST LOW 20 MIN: CPT | Mod: S$GLB,,, | Performed by: NURSE PRACTITIONER

## 2024-10-15 RX ORDER — TIOTROPIUM BROMIDE AND OLODATEROL 3.124; 2.736 UG/1; UG/1
2 SPRAY, METERED RESPIRATORY (INHALATION) DAILY
Qty: 4 G | Refills: 6 | Status: SHIPPED | OUTPATIENT
Start: 2024-10-15

## 2024-10-15 NOTE — PROGRESS NOTES
SUBJECTIVE:    Patient ID: Tyler Hannah is a 74 y.o. male.    Chief Complaint: Follow-up and Asthma      Patient here today feeling well. He stopped using the Symbicort on Friday due to concern about it effecting his eyes as he has increased pressures.  The Symbicort did help his breathing a lot. He was suffering with throat irritation as well from the steroid despite rinsing after use.   His Peak flow is 500 today.  He is taking the Singulair at night. His PFT showed supranormal diffusion capacity but he does not want to have ECHO to assess.   Past Medical History:   Diagnosis Date    Arthritis     Asthma     Family history of colon cancer in mother 03/05/2020    RAULITO (obstructive sleep apnea)     Unspecified glaucoma      Past Surgical History:   Procedure Laterality Date    right arm      TRANSFORAMINAL EPIDURAL INJECTION OF STEROID Right 5/21/2019    Procedure: Injection,steroid,epidural,transforaminal approach;  Surgeon: Blade Devine MD;  Location: [facility];  Service: Pain Management;  Laterality: Right;  C6, C7     Family History   Problem Relation Name Age of Onset    Cancer Mother  41        colon CA    Diabetes Father      Heart disease Father      No Known Problems Sister          Social History:   Marital Status:   Occupation:  and   Alcohol History:  reports current alcohol use.  Tobacco History:  reports that he has never smoked. He has been exposed to tobacco smoke. He has never used smokeless tobacco.  Drug History:  reports no history of drug use.    Review of patient's allergies indicates:   Allergen Reactions    Statins-hmg-coa reductase inhibitors      Severe muscle pain       Current Outpatient Medications   Medication Sig Dispense Refill    albuterol (ACCUNEB) 1.25 mg/3 mL Nebu Take 3 mLs (1.25 mg total) by nebulization every 6 (six) hours as needed (cough, shortness of breath). Rescue 75 mL 6    albuterol (VENTOLIN HFA) 90 mcg/actuation inhaler Inhale 2 puffs into  "the lungs every 4 (four) hours as needed for Wheezing. Rescue 18 g 2    azelastine (ASTELIN) 137 mcg (0.1 %) nasal spray 1 spray by Nasal route.      betamethasone dipropionate 0.05 % cream Spot treat itchy plaques BID 45 g 3    dorzolamide-timolol 2-0.5% (COSOPT) 22.3-6.8 mg/mL ophthalmic solution INSTILL 1 DROP INTO EACH EYE TWICE DAILY FOR 30 DAYS      gabapentin (NEURONTIN) 300 MG capsule Take 1 capsule (300 mg total) by mouth 3 (three) times daily. 270 capsule 3    meloxicam (MOBIC) 15 MG tablet Take 1 tablet (15 mg total) by mouth once daily. 90 tablet 1    montelukast (SINGULAIR) 10 mg tablet Take 1 tablet (10 mg total) by mouth every evening. 30 tablet 6    fluticasone propionate (FLONASE) 50 mcg/actuation nasal spray 1 spray by Each Nostril route once daily. (Patient not taking: Reported on 10/15/2024)      KRILL OIL ORAL Take by mouth 2 (two) times a day. (Patient not taking: Reported on 6/13/2024)      tiotropium-olodateroL (STIOLTO RESPIMAT) 2.5-2.5 mcg/actuation Mist Inhale 2 puffs into the lungs once daily. Controller 4 g 6    triamcinolone acetonide 0.1% (KENALOG) 0.1 % cream Apply topically as needed (eczema). (Patient not taking: Reported on 10/15/2024) 454 g 1     No current facility-administered medications for this visit.           Review of Systems  General: Feeling Well.  Eyes: Vision is good.  ENT:  difficulty breathing through nose .   Heart:: No chest pain or palpitations.  Lungs: increased dyspnea and wheeze over the last two weeks.  GI: No Nausea, vomiting, constipation, diarrhea, or reflux.  : No dysuria, hesitancy, or nocturia.  Musculoskeletal: No joint pain or myalgias.  Skin: No lesions or rashes.  Neuro: No headaches or neuropathy.  Lymph: No edema or adenopathy.  Psych: No anxiety or depression.  Endo: No weight change.    OBJECTIVE:      BP (!) 160/90   Pulse 88   Ht 5' 6" (1.676 m)   Wt 89.8 kg (198 lb)   SpO2 96%   BMI 31.96 kg/m²     Physical Exam  GENERAL: Older patient " in no distress.  HEENT: Pupils equal and reactive. Extraocular movements intact. Nose intact.      Pharynx moist.  NECK: Supple.   HEART: Regular rate and rhythm. No murmur or gallop auscultated.  LUNGS: Clear to auscultation and percussion. Lung excursion symmetrical. No change in fremitus. No adventitial noises.  ABDOMEN: Bowel sounds present. Non-tender, no masses palpated.  EXTREMITIES: Normal muscle tone and joint movement, no cyanosis or clubbing.   LYMPHATICS: No adenopathy palpated, no edema.  SKIN: Dry, intact, no lesions.   NEURO: Cranial nerves II-XII intact. Motor strength 5/5 bilaterally, upper and lower extremities.  PSYCH: Appropriate affect.    Assessment:       1. Moderate persistent asthma, unspecified whether complicated            Plan:       Moderate persistent asthma, unspecified whether complicated    Other orders  -     tiotropium-olodateroL (STIOLTO RESPIMAT) 2.5-2.5 mcg/actuation Mist; Inhale 2 puffs into the lungs once daily. Controller  Dispense: 4 g; Refill: 6           Follow up in about 3 months (around 1/15/2025).    Stop the Symbicort  Switch to Stiolto 2 puffs once a day     Continue the Singulair 10mg at night   Refuses echo to assess supranormal diffusion capacity  Let your eye doctor know about the inhalers     Get a peak flow meter  Green is 450 and above use your stiolto daily  Yellow zone is 280-450 continue the stiolto daily, use rescue inhaler every 4-6hours, if still in yellow after 48 hours call MD  Red zone is less then 280 Use 4 puffs of rescue and call MD immediately           Answers submitted by the patient for this visit:  Asthma Control Test (Submitted on 10/10/2024)  Chief Complaint: Asthma  In the past 4 weeks, how much of the time did your asthma keep you from getting as much done at work, school, or at home?: a little of the time  During the past 4 weeks, how often have you had shortness of breath?: once or twice a week  During the past 4 weeks, how often did  your asthma symptoms (Wheezing, coughing, shortness of breath, chest tightness or pain) wake you up at night or earlier that usual in the morning?: not at all  During the past 4 weeks, how often have you used your rescue inhaler or nebulizer medication (such as albuterol)?: 2 or 3 times a week  How would you rate your asthma control during the past 4 weeks?: somewhat controlled   : 19  Questionnaire about: Asthma (Submitted on 10/10/2024)  Chief Complaint: Asthma

## 2024-10-15 NOTE — PATIENT INSTRUCTIONS
Stop the Symbicort  Switch to Stiolto 2 puffs once a day     Continue the Singulair 10mg at night   Refuses echo to assess hyperinflation   Let your eye doctor know about the inhalers     Get a peak flow meter  Green is 450 and above use your stiolto daily  Yellow zone is 280-450 continue the stiolto daily, use rescue inhaler every 4-6hours, if still in yellow after 48 hours call MD  Red zone is less then 280 Use 4 puffs of rescue and call MD immediately

## 2024-12-13 DIAGNOSIS — L98.9 DISEASE OF SKIN AND SUBCUTANEOUS TISSUE: ICD-10-CM

## 2024-12-13 RX ORDER — BETAMETHASONE DIPROPIONATE 0.5 MG/G
CREAM TOPICAL
Qty: 45 G | Refills: 0 | Status: SHIPPED | OUTPATIENT
Start: 2024-12-13

## 2024-12-14 DIAGNOSIS — L98.9 DISEASE OF SKIN AND SUBCUTANEOUS TISSUE: ICD-10-CM

## 2024-12-16 DIAGNOSIS — L98.9 DISEASE OF SKIN AND SUBCUTANEOUS TISSUE: ICD-10-CM

## 2024-12-16 DIAGNOSIS — L20.84 INTRINSIC ATOPIC DERMATITIS: Primary | ICD-10-CM

## 2024-12-16 RX ORDER — BETAMETHASONE DIPROPIONATE 0.5 MG/G
CREAM TOPICAL
Qty: 45 G | Refills: 0 | Status: SHIPPED | OUTPATIENT
Start: 2024-12-16

## 2024-12-16 RX ORDER — BETAMETHASONE DIPROPIONATE 0.5 MG/G
CREAM TOPICAL
Qty: 45 G | Refills: 0 | Status: SHIPPED | OUTPATIENT
Start: 2024-12-16 | End: 2024-12-16 | Stop reason: SDUPTHER

## 2025-02-05 ENCOUNTER — TELEPHONE (OUTPATIENT)
Dept: PULMONOLOGY | Facility: CLINIC | Age: 75
End: 2025-02-05
Payer: MEDICARE

## 2025-02-05 RX ORDER — MONTELUKAST SODIUM 10 MG/1
10 TABLET ORAL NIGHTLY
Qty: 30 TABLET | Refills: 6 | Status: SHIPPED | OUTPATIENT
Start: 2025-02-05 | End: 2025-03-07

## 2025-02-21 DIAGNOSIS — Z00.00 ENCOUNTER FOR MEDICARE ANNUAL WELLNESS EXAM: ICD-10-CM

## 2025-03-30 DIAGNOSIS — M51.369 DDD (DEGENERATIVE DISC DISEASE), LUMBAR: ICD-10-CM

## 2025-03-31 RX ORDER — MELOXICAM 15 MG/1
15 TABLET ORAL DAILY
Qty: 90 TABLET | Refills: 1 | Status: SHIPPED | OUTPATIENT
Start: 2025-03-31

## 2025-05-10 DIAGNOSIS — J45.20 MILD INTERMITTENT ASTHMA WITHOUT COMPLICATION: ICD-10-CM

## 2025-05-12 RX ORDER — ALBUTEROL SULFATE 90 UG/1
2 INHALANT RESPIRATORY (INHALATION) EVERY 4 HOURS PRN
Qty: 18 G | Refills: 2 | Status: SHIPPED | OUTPATIENT
Start: 2025-05-12

## 2025-05-12 NOTE — TELEPHONE ENCOUNTER
Pt is needing a refill on his albuterol inhaler. Last office visit 06/13/2024. Next office visit 06/16/2025.

## 2025-05-13 ENCOUNTER — TELEPHONE (OUTPATIENT)
Dept: FAMILY MEDICINE | Facility: CLINIC | Age: 75
End: 2025-05-13
Payer: MEDICARE

## 2025-05-13 NOTE — TELEPHONE ENCOUNTER
----- Message from Susanne sent at 5/13/2025  9:50 AM CDT -----  Wife Tamika calling to see if the request for albuterol was received?[phone]

## 2025-05-14 ENCOUNTER — TELEPHONE (OUTPATIENT)
Dept: FAMILY MEDICINE | Facility: CLINIC | Age: 75
End: 2025-05-14
Payer: MEDICARE

## 2025-05-14 NOTE — TELEPHONE ENCOUNTER
----- Message from Zuleima sent at 5/14/2025 10:10 AM CDT -----  Contact: [facility]  Can [facility] switch the inhaler to Generic proventil. The Ventolin is not covered. Please advise. #[phone]

## 2025-06-04 ENCOUNTER — TELEPHONE (OUTPATIENT)
Dept: FAMILY MEDICINE | Facility: CLINIC | Age: 75
End: 2025-06-04
Payer: MEDICARE

## 2025-06-04 DIAGNOSIS — E78.00 ELEVATED LDL CHOLESTEROL LEVEL: ICD-10-CM

## 2025-06-04 DIAGNOSIS — Z79.899 ENCOUNTER FOR LONG-TERM (CURRENT) USE OF MEDICATIONS: Primary | ICD-10-CM

## 2025-06-04 DIAGNOSIS — I10 PRIMARY HYPERTENSION: ICD-10-CM

## 2025-06-16 ENCOUNTER — OFFICE VISIT (OUTPATIENT)
Dept: FAMILY MEDICINE | Facility: CLINIC | Age: 75
End: 2025-06-16
Payer: MEDICARE

## 2025-06-16 VITALS
WEIGHT: 198 LBS | DIASTOLIC BLOOD PRESSURE: 72 MMHG | BODY MASS INDEX: 31.82 KG/M2 | SYSTOLIC BLOOD PRESSURE: 144 MMHG | HEART RATE: 76 BPM | HEIGHT: 66 IN | OXYGEN SATURATION: 97 %

## 2025-06-16 DIAGNOSIS — M51.360 DEGENERATION OF INTERVERTEBRAL DISC OF LUMBAR REGION WITH DISCOGENIC BACK PAIN: ICD-10-CM

## 2025-06-16 DIAGNOSIS — R03.0 ELEVATED BLOOD PRESSURE READING: ICD-10-CM

## 2025-06-16 DIAGNOSIS — L20.84 INTRINSIC ATOPIC DERMATITIS: ICD-10-CM

## 2025-06-16 DIAGNOSIS — Z00.00 ANNUAL PHYSICAL EXAM: Primary | ICD-10-CM

## 2025-06-16 DIAGNOSIS — J45.40 MODERATE PERSISTENT ASTHMA WITHOUT COMPLICATION: ICD-10-CM

## 2025-06-16 DIAGNOSIS — M25.562 ACUTE PAIN OF LEFT KNEE: ICD-10-CM

## 2025-06-16 RX ORDER — ALBUTEROL SULFATE 90 UG/1
2 INHALANT RESPIRATORY (INHALATION) EVERY 4 HOURS PRN
Qty: 18 G | Refills: 2 | Status: SHIPPED | OUTPATIENT
Start: 2025-06-16

## 2025-06-16 RX ORDER — MONTELUKAST SODIUM 10 MG/1
10 TABLET ORAL NIGHTLY
COMMUNITY

## 2025-06-16 RX ORDER — GABAPENTIN 300 MG/1
300 CAPSULE ORAL 3 TIMES DAILY
Qty: 270 CAPSULE | Refills: 3 | Status: SHIPPED | OUTPATIENT
Start: 2025-06-16

## 2025-06-16 RX ORDER — BETAMETHASONE DIPROPIONATE 0.5 MG/G
CREAM TOPICAL
Qty: 45 G | Refills: 0 | Status: SHIPPED | OUTPATIENT
Start: 2025-06-16

## 2025-06-16 RX ORDER — ALBUTEROL SULFATE 1.25 MG/3ML
1.25 SOLUTION RESPIRATORY (INHALATION) EVERY 6 HOURS PRN
Qty: 120 ML | Refills: 6 | Status: SHIPPED | OUTPATIENT
Start: 2025-06-16 | End: 2026-06-16

## 2025-06-16 RX ORDER — TIOTROPIUM BROMIDE AND OLODATEROL 3.124; 2.736 UG/1; UG/1
2 SPRAY, METERED RESPIRATORY (INHALATION) DAILY
Qty: 4 G | Refills: 11 | Status: SHIPPED | OUTPATIENT
Start: 2025-06-16

## 2025-06-16 NOTE — PROGRESS NOTES
"  SUBJECTIVE:    Patient ID: Tyler Hannah is a 74 y.o. male.    Chief Complaint: Annual Exam (Bottles brought// Pt is here for an annual exam and medication refills// Pt decline colo//KMS)    Pt here for annual visit- lumbar DDD, asthma. Pt here with his wife today    Pt reports about 1 month ago went to put his on left shoe and when he extended left knee had sudden pain with feeling "something popped" in left popliteal area- reports had swelling to posterior knee and severe pain for several days but overall      Follows with DIDIER Augustine NP with pulm for mod asthma- reports was having issue with asthma flaring up. Stiolto was added which has helped, now using neb about twice a day    Pt aware he's overdue for colon CA screening- Mother had hx of colon CA-  at age 42- pt had cscope age 64 with Dr. Duque and had 3 polyps removed. Told to repeat in 3 years but he has declined further cscope. had cologuard in  which was negative and refuses further cscope    Hx of lumbar DDD- stable on gabapentin.  Denies sciatica or leg weakness.  No falls        Telephone on 2025   Component Date Value Ref Range Status    WBC 2025 4.7  3.8 - 10.8 Thousand/uL Final    RBC 2025 5.33  4.20 - 5.80 Million/uL Final    Hemoglobin 2025 16.7  13.2 - 17.1 g/dL Final    Hematocrit 2025 51.1 (H)  38.5 - 50.0 % Final    MCV 2025 95.9  80.0 - 100.0 fL Final    MCH 2025 31.3  27.0 - 33.0 pg Final    MCHC 2025 32.7  32.0 - 36.0 g/dL Final    RDW 2025 13.0  11.0 - 15.0 % Final    Platelets 2025 152  140 - 400 Thousand/uL Final    MPV 2025 10.0  7.5 - 12.5 fL Final    Neutrophils, Abs 2025 3,055  1,500 - 7,800 cells/uL Final    Lymph # 2025 1,029  850 - 3,900 cells/uL Final    Mono # 2025 329  200 - 950 cells/uL Final    Eos # 2025 259  15 - 500 cells/uL Final    Baso # 2025 28  0 - 200 cells/uL Final    Neutrophils Relative 2025 65  % Final "    Lymph % 06/11/2025 21.9  % Final    Mono % 06/11/2025 7.0  % Final    Eosinophil % 06/11/2025 5.5  % Final    Basophil % 06/11/2025 0.6  % Final    Glucose 06/11/2025 104 (H)  65 - 99 mg/dL Final    BUN 06/11/2025 20  7 - 25 mg/dL Final    Creatinine 06/11/2025 0.71  0.70 - 1.28 mg/dL Final    eGFR 06/11/2025 97  > OR = 60 mL/min/1.73m2 Final    BUN/Creatinine Ratio 06/11/2025 SEE NOTE:  6 - 22 (calc) Final    Sodium 06/11/2025 139  135 - 146 mmol/L Final    Potassium 06/11/2025 4.7  3.5 - 5.3 mmol/L Final    Chloride 06/11/2025 103  98 - 110 mmol/L Final    CO2 06/11/2025 26  20 - 32 mmol/L Final    Calcium 06/11/2025 9.1  8.6 - 10.3 mg/dL Final    Total Protein 06/11/2025 7.2  6.1 - 8.1 g/dL Final    Albumin 06/11/2025 4.6  3.6 - 5.1 g/dL Final    Globulin, Total 06/11/2025 2.6  1.9 - 3.7 g/dL (calc) Final    Albumin/Globulin Ratio 06/11/2025 1.8  1.0 - 2.5 (calc) Final    Total Bilirubin 06/11/2025 0.7  0.2 - 1.2 mg/dL Final    Alkaline Phosphatase 06/11/2025 52  35 - 144 U/L Final    AST 06/11/2025 20  10 - 35 U/L Final    ALT 06/11/2025 11  9 - 46 U/L Final    Cholesterol 06/11/2025 216 (H)  <200 mg/dL Final    HDL 06/11/2025 38 (L)  > OR = 40 mg/dL Final    Triglycerides 06/11/2025 268 (H)  <150 mg/dL Final    LDL Cholesterol 06/11/2025 137 (H)  mg/dL (calc) Final    HDL/Cholesterol Ratio 06/11/2025 5.7 (H)  <5.0 (calc) Final    Non HDL Chol. (LDL+VLDL) 06/11/2025 178 (H)  <130 mg/dL (calc) Final    TSH w/reflex to FT4 06/11/2025 1.28  0.40 - 4.50 mIU/L Final    Color, UA 06/11/2025 YELLOW  YELLOW Final    Appearance, UA 06/11/2025 CLEAR  CLEAR Final    Specific Gravity, UA 06/11/2025 1.024  1.001 - 1.035 Final    pH, UA 06/11/2025 7.0  5.0 - 8.0 Final    Glucose, UA 06/11/2025 NEGATIVE  NEGATIVE Final    Bilirubin, UA 06/11/2025 NEGATIVE  NEGATIVE Final    Ketones, UA 06/11/2025 NEGATIVE  NEGATIVE Final    Occult Blood UA 06/11/2025 NEGATIVE  NEGATIVE Final    Protein, UA 06/11/2025 NEGATIVE  NEGATIVE  Final    Nitrite, UA 06/11/2025 NEGATIVE  NEGATIVE Final    Leukocytes, UA 06/11/2025 NEGATIVE  NEGATIVE Final    WBC Casts, UA 06/11/2025 NONE SEEN  < OR = 5 /HPF Final    RBC Casts, UA 06/11/2025 NONE SEEN  < OR = 2 /HPF Final    Squam Epithel, UA 06/11/2025 NONE SEEN  < OR = 5 /HPF Final    Bacteria, UA 06/11/2025 NONE SEEN  NONE SEEN /HPF Final    Hyaline Casts, UA 06/11/2025 NONE SEEN  NONE SEEN /LPF Final    Service Cmt: 06/11/2025 SEE COMMENT   Final    Reflexive Urine Culture 06/11/2025 SEE COMMENT   Final       Past Medical History:   Diagnosis Date    Arthritis     Asthma     Family history of colon cancer in mother 03/05/2020    RAULITO (obstructive sleep apnea)     Unspecified glaucoma      Past Surgical History:   Procedure Laterality Date    right arm      TRANSFORAMINAL EPIDURAL INJECTION OF STEROID Right 5/21/2019    Procedure: Injection,steroid,epidural,transforaminal approach;  Surgeon: Blade Devine MD;  Location: [facility];  Service: Pain Management;  Laterality: Right;  C6, C7     Family History   Problem Relation Name Age of Onset    Cancer Mother  41        colon CA    Diabetes Father      Heart disease Father      No Known Problems Sister         Tests to Keep You Healthy    Colon Cancer Screening: DUE      The 10-year CVD risk score (D'Agostino, et al., 2008) is: 36.8%    Values used to calculate the score:      Age: 74 years      Sex: Male      Diabetic: No      Tobacco smoker: No      Systolic Blood Pressure: 144 mmHg      Is BP treated: No      HDL Cholesterol: 38 mg/dL      Total Cholesterol: 216 mg/dL     Marital Status:   Alcohol History:  reports current alcohol use.  Tobacco History:  reports that he has never smoked. He has been exposed to tobacco smoke. He has never used smokeless tobacco.  Drug History:  reports no history of drug use.    Health Maintenance Topics with due status: Not Due       Topic Last Completion Date    Influenza Vaccine 10/24/2022    Lipid Panel 06/11/2025      Immunization History   Administered Date(s) Administered    COVID-19, MRNA, LN-S, PF (Pfizer) (Purple Cap) 04/16/2021, 05/07/2021    Influenza 10/09/2014    Influenza (FLUAD) - Quadrivalent - Adjuvanted - PF *Preferred* (65+) 10/24/2022    Influenza - Quadrivalent 10/13/2015    Influenza - Quadrivalent - High Dose - PF (65 years and older) 10/12/2020, 10/21/2021    Influenza - Quadrivalent - PF *Preferred* (6 months and older) 11/29/2016    Influenza - Trivalent - Fluad - Adjuvanted - PF (65 years and older 11/14/2018, 10/23/2019    Influenza - Trivalent - Fluzone High Dose - PF (65 years and older) 11/27/2017, 10/23/2019    Pneumococcal Conjugate - 13 Valent 06/04/2019    Pneumococcal Polysaccharide - 23 Valent 09/01/2020       Review of patient's allergies indicates:   Allergen Reactions    Statins-hmg-coa reductase inhibitors      Severe muscle pain     Current Medications[1]    Review of Systems   Constitutional:  Negative for activity change, chills, fever and unexpected weight change.   HENT:  Positive for hearing loss. Negative for rhinorrhea and trouble swallowing.    Eyes:  Negative for discharge and visual disturbance.   Respiratory:  Negative for cough, chest tightness, shortness of breath and wheezing.    Cardiovascular:  Negative for chest pain, palpitations and leg swelling.   Gastrointestinal:  Negative for abdominal pain, blood in stool, constipation, diarrhea and vomiting.   Endocrine: Negative for polydipsia and polyuria.   Genitourinary:  Negative for difficulty urinating, dysuria, frequency, hematuria and urgency.   Musculoskeletal:  Positive for arthralgias (left posterior knee pain- improved from last month) and back pain. Negative for joint swelling and neck pain.   Skin:  Negative for rash.   Neurological:  Negative for dizziness, syncope, weakness and headaches.   Psychiatric/Behavioral:  Negative for confusion and dysphoric mood.           Objective:      Vitals:    06/16/25 0855  "06/16/25 0908 06/16/25 0955   BP: (!) 162/80 (!) 164/74 (!) 144/72   Pulse: 76     SpO2: 97%     Weight: 89.8 kg (198 lb)     Height: 5' 6" (1.676 m)       Physical Exam  Vitals and nursing note reviewed.   Constitutional:       General: He is not in acute distress.     Appearance: Normal appearance. He is well-developed and normal weight.   HENT:      Head: Normocephalic and atraumatic.      Right Ear: Tympanic membrane and ear canal normal.      Left Ear: Tympanic membrane and ear canal normal.   Neck:      Vascular: No carotid bruit.   Cardiovascular:      Rate and Rhythm: Normal rate and regular rhythm.      Heart sounds: No murmur heard.  Pulmonary:      Effort: Pulmonary effort is normal. No respiratory distress.      Breath sounds: Normal breath sounds. No wheezing or rales.   Abdominal:      Palpations: Abdomen is soft.      Tenderness: There is no abdominal tenderness.   Musculoskeletal:      Cervical back: Neck supple.      Left knee: Crepitus present. No swelling, effusion or erythema. Normal range of motion.      Right lower leg: No edema.      Left lower leg: No edema.      Comments: +palpable bakers cyst left popliteal   Lymphadenopathy:      Cervical: No cervical adenopathy.   Skin:     General: Skin is warm and dry.      Findings: No rash.   Neurological:      General: No focal deficit present.      Mental Status: He is alert and oriented to person, place, and time.      Gait: Gait normal.           Assessment:       1. Annual physical exam    2. Acute pain of left knee    3. Moderate persistent asthma without complication    4. Elevated blood pressure reading    5. Degeneration of intervertebral disc of lumbar region with discogenic back pain    6. Intrinsic atopic dermatitis           Plan:       1. Annual physical exam    2. Acute pain of left knee  -discussed sudden onset of knee pain last month- reports overall pain has improved. DDX includes ruptured bakers cyst vs ligament strain vs OA. Pt " reports overall knee pain has improved. Recommend xray and discussed referral to ortho though he declines referral  -     X-Ray Knee 3 View Left; Future; Expected date: 06/16/2025    3. Moderate persistent asthma without complication  -pt reports some improvement with addition of stiolto  -     tiotropium-olodateroL (STIOLTO RESPIMAT) 2.5-2.5 mcg/actuation Mist; Inhale 2 puffs into the lungs once daily. Controller  Dispense: 4 g; Refill: 11  -     albuterol (VENTOLIN HFA) 90 mcg/actuation inhaler; Inhale 2 puffs into the lungs every 4 (four) hours as needed for Wheezing. Rescue  Dispense: 18 g; Refill: 2  -     albuterol (ACCUNEB) 1.25 mg/3 mL Nebu; Take 3 mLs (1.25 mg total) by nebulization every 6 (six) hours as needed (cough, shortness of breath). Rescue  Dispense: 120 mL; Refill: 6    4. Elevated blood pressure reading  -BP elevated today and appears to have been elevated on previous readings. Discussed imp of BP control to reduce risk of CV disease. Advised I recommend starting medication which he declines. Discussed low salt diet and regular exercise which can help lower BP some. Recommend he monitor BP at home which he also declines. Lastly recommend he f/u in the next month or two to recheck BP but he declines and only wants to come in for annual visits    5. Degeneration of intervertebral disc of lumbar region with discogenic back pain  -stable on med  -     gabapentin (NEURONTIN) 300 MG capsule; Take 1 capsule (300 mg total) by mouth 3 (three) times daily.  Dispense: 270 capsule; Refill: 3    6. Intrinsic atopic dermatitis  -stable, uses steroid cream prn  -     betamethasone dipropionate 0.05 % cream; Apply 1g to itchy plaques on body adily to BID PRN flare  Dispense: 45 g; Refill: 0      Follow up in about 1 year (around 6/16/2026) for annual visit.      Pt declines colon CA screening despite family hx of cautioned on benefits of early detection    Counseled on age and gender appropriate medical  preventative services, including cancer screenings, immunizations, overall nutritional health, need for a consistent exercise regimen and an overall push towards maintaining a vigorous and active lifestyle.      6/16/2025 Zuleima Caballero NP           [1]   Current Outpatient Medications:     dorzolamide-timolol 2-0.5% (COSOPT) 22.3-6.8 mg/mL ophthalmic solution, INSTILL 1 DROP INTO EACH EYE TWICE DAILY FOR 30 DAYS, Disp: , Rfl:     meloxicam (MOBIC) 15 MG tablet, Take 1 tablet (15 mg total) by mouth once daily., Disp: 90 tablet, Rfl: 1    montelukast (SINGULAIR) 10 mg tablet, Take 10 mg by mouth every evening., Disp: , Rfl:     albuterol (ACCUNEB) 1.25 mg/3 mL Nebu, Take 3 mLs (1.25 mg total) by nebulization every 6 (six) hours as needed (cough, shortness of breath). Rescue, Disp: 120 mL, Rfl: 6    albuterol (VENTOLIN HFA) 90 mcg/actuation inhaler, Inhale 2 puffs into the lungs every 4 (four) hours as needed for Wheezing. Rescue, Disp: 18 g, Rfl: 2    betamethasone dipropionate 0.05 % cream, Apply 1g to itchy plaques on body adily to BID PRN flare, Disp: 45 g, Rfl: 0    fluticasone propionate (FLONASE) 50 mcg/actuation nasal spray, 1 spray by Each Nostril route once daily. (Patient not taking: Reported on 6/13/2024), Disp: , Rfl:     gabapentin (NEURONTIN) 300 MG capsule, Take 1 capsule (300 mg total) by mouth 3 (three) times daily., Disp: 270 capsule, Rfl: 3    tiotropium-olodateroL (STIOLTO RESPIMAT) 2.5-2.5 mcg/actuation Mist, Inhale 2 puffs into the lungs once daily. Controller, Disp: 4 g, Rfl: 11

## 2025-06-20 ENCOUNTER — HOSPITAL ENCOUNTER (OUTPATIENT)
Dept: RADIOLOGY | Facility: HOSPITAL | Age: 75
Discharge: HOME | End: 2025-06-20
Attending: NURSE PRACTITIONER
Payer: MEDICARE

## 2025-06-20 DIAGNOSIS — M25.562 ACUTE PAIN OF LEFT KNEE: ICD-10-CM

## 2025-06-27 ENCOUNTER — RESULTS FOLLOW-UP (OUTPATIENT)
Dept: FAMILY MEDICINE | Facility: CLINIC | Age: 75
End: 2025-06-27

## 2025-06-27 ENCOUNTER — HOSPITAL ENCOUNTER (OUTPATIENT)
Dept: RADIOLOGY | Facility: HOSPITAL | Age: 75
Discharge: HOME | End: 2025-06-27
Attending: NURSE PRACTITIONER
Payer: MEDICARE

## 2025-06-27 PROCEDURE — 73562 X-RAY EXAM OF KNEE 3: CPT | Mod: 26,LT,, | Performed by: RADIOLOGY

## 2025-06-27 PROCEDURE — 73562 X-RAY EXAM OF KNEE 3: CPT | Mod: TC,PO,LT

## 2025-07-03 DIAGNOSIS — L20.84 INTRINSIC ATOPIC DERMATITIS: ICD-10-CM

## 2025-07-03 RX ORDER — BETAMETHASONE DIPROPIONATE 0.5 MG/G
CREAM TOPICAL
Qty: 45 G | Refills: 0 | Status: SHIPPED | OUTPATIENT
Start: 2025-07-03

## 2025-07-03 NOTE — TELEPHONE ENCOUNTER
Pt is needing a refill on his betamethasone cream. Last office visit 06/16/2025. Next office visit 06/17/2026.

## 2025-07-28 DIAGNOSIS — M51.369 DDD (DEGENERATIVE DISC DISEASE), LUMBAR: ICD-10-CM

## 2025-07-28 RX ORDER — MELOXICAM 15 MG/1
15 TABLET ORAL DAILY
Qty: 90 TABLET | Refills: 1 | Status: SHIPPED | OUTPATIENT
Start: 2025-07-28

## 2025-07-30 ENCOUNTER — PATIENT OUTREACH (OUTPATIENT)
Dept: ADMINISTRATIVE | Facility: HOSPITAL | Age: 75
End: 2025-07-30
Payer: MEDICARE

## 2025-07-30 NOTE — PROGRESS NOTES
Population Health Chart Review & Patient Outreach Details      Additional [facility] Health Notes:               Updates Requested / Reviewed:      Updated Care Coordination Note, Care Everywhere, , and Immunizations Reconciliation Completed or Queried: [facility] Topics Overdue:      [facility] Score: 1     Colon Cancer Screening    Tetanus Vaccine  Shingles/Zoster Vaccine  RSV Vaccine                  Health Maintenance Topic(s) Outreach Outcomes & Actions Taken:    Colorectal Cancer Screening - Outreach Outcomes & Actions Taken  : The patient was sent a message about his colon screening choices.

## 2025-08-22 RX ORDER — MONTELUKAST SODIUM 10 MG/1
10 TABLET ORAL NIGHTLY
Qty: 30 TABLET | Refills: 6 | Status: SHIPPED | OUTPATIENT
Start: 2025-08-22

## (undated) DEVICE — TUBING MINIBORE EXTENSION

## (undated) DEVICE — GLOVE PROTEXIS PI CLASSIC 7.5

## (undated) DEVICE — NDL HYPODERMIC BLUNT 18G 1.5IN

## (undated) DEVICE — SYS LABEL CORRECT MED

## (undated) DEVICE — APPLICATOR CHLORAPREP CLR 10.5

## (undated) DEVICE — NDL SPINAL NEO QNCKE 25GX2IN

## (undated) DEVICE — SYR DISP LL 5CC

## (undated) DEVICE — NDL SAFETY 25G X 1.5 ECLIPSE

## (undated) DEVICE — GLOVE PROTEXIS PI CLASSIC 6.0

## (undated) DEVICE — GLOVE PROTEXIS PI CLASSIC 6.5